# Patient Record
Sex: FEMALE | Race: WHITE | NOT HISPANIC OR LATINO | Employment: FULL TIME | URBAN - METROPOLITAN AREA
[De-identification: names, ages, dates, MRNs, and addresses within clinical notes are randomized per-mention and may not be internally consistent; named-entity substitution may affect disease eponyms.]

---
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
## 2020-01-29 ENCOUNTER — OFFICE VISIT (OUTPATIENT)
Dept: FAMILY MEDICINE | Facility: CLINIC | Age: 20
End: 2020-01-29
Payer: COMMERCIAL

## 2020-01-29 VITALS
RESPIRATION RATE: 18 BRPM | SYSTOLIC BLOOD PRESSURE: 121 MMHG | HEIGHT: 64 IN | TEMPERATURE: 97.6 F | BODY MASS INDEX: 45.27 KG/M2 | HEART RATE: 81 BPM | WEIGHT: 265.2 LBS | OXYGEN SATURATION: 99 % | DIASTOLIC BLOOD PRESSURE: 78 MMHG

## 2020-01-29 DIAGNOSIS — Z00.00 ROUTINE GENERAL MEDICAL EXAMINATION AT A HEALTH CARE FACILITY: Primary | ICD-10-CM

## 2020-01-29 DIAGNOSIS — R35.0 URINARY FREQUENCY: ICD-10-CM

## 2020-01-29 DIAGNOSIS — K21.9 GASTROESOPHAGEAL REFLUX DISEASE, ESOPHAGITIS PRESENCE NOT SPECIFIED: ICD-10-CM

## 2020-01-29 DIAGNOSIS — F32.1 CURRENT MODERATE EPISODE OF MAJOR DEPRESSIVE DISORDER WITHOUT PRIOR EPISODE (H): ICD-10-CM

## 2020-01-29 DIAGNOSIS — R06.09 DYSPNEA ON EXERTION: ICD-10-CM

## 2020-01-29 DIAGNOSIS — G43.109 MIGRAINE WITH AURA AND WITHOUT STATUS MIGRAINOSUS, NOT INTRACTABLE: ICD-10-CM

## 2020-01-29 PROBLEM — M25.552 HIP PAIN, LEFT: Status: RESOLVED | Noted: 2018-02-02 | Resolved: 2020-01-29

## 2020-01-29 PROBLEM — M25.551 HIP PAIN, RIGHT: Status: RESOLVED | Noted: 2018-02-02 | Resolved: 2020-01-29

## 2020-01-29 LAB
ALBUMIN SERPL-MCNC: 4 G/DL (ref 3.4–5)
ALBUMIN UR-MCNC: NEGATIVE MG/DL
ALP SERPL-CCNC: 70 U/L (ref 40–150)
ALT SERPL W P-5'-P-CCNC: 22 U/L (ref 0–50)
ANION GAP SERPL CALCULATED.3IONS-SCNC: 8 MMOL/L (ref 3–14)
APPEARANCE UR: CLEAR
AST SERPL W P-5'-P-CCNC: 12 U/L (ref 0–35)
BILIRUB SERPL-MCNC: 0.4 MG/DL (ref 0.2–1.3)
BILIRUB UR QL STRIP: ABNORMAL
BUN SERPL-MCNC: 11 MG/DL (ref 7–30)
CALCIUM SERPL-MCNC: 9.4 MG/DL (ref 8.5–10.1)
CHLORIDE SERPL-SCNC: 114 MMOL/L (ref 96–110)
CHOLEST SERPL-MCNC: 185 MG/DL
CO2 SERPL-SCNC: 17 MMOL/L (ref 20–32)
COLOR UR AUTO: YELLOW
CREAT SERPL-MCNC: 0.9 MG/DL (ref 0.5–1)
GFR SERPL CREATININE-BSD FRML MDRD: >90 ML/MIN/{1.73_M2}
GLUCOSE SERPL-MCNC: 105 MG/DL (ref 70–99)
GLUCOSE UR STRIP-MCNC: NEGATIVE MG/DL
HBA1C MFR BLD: 4.9 % (ref 0–5.6)
HDLC SERPL-MCNC: 44 MG/DL
HGB UR QL STRIP: NEGATIVE
KETONES UR STRIP-MCNC: NEGATIVE MG/DL
LDLC SERPL CALC-MCNC: 114 MG/DL
LEUKOCYTE ESTERASE UR QL STRIP: NEGATIVE
NITRATE UR QL: NEGATIVE
NONHDLC SERPL-MCNC: 141 MG/DL
PH UR STRIP: 5.5 PH (ref 5–7)
POTASSIUM SERPL-SCNC: 3.8 MMOL/L (ref 3.4–5.3)
PROT SERPL-MCNC: 8.2 G/DL (ref 6.8–8.8)
SODIUM SERPL-SCNC: 139 MMOL/L (ref 133–144)
SOURCE: ABNORMAL
SP GR UR STRIP: >1.03 (ref 1–1.03)
SPECIMEN SOURCE: NORMAL
TRIGL SERPL-MCNC: 137 MG/DL
UROBILINOGEN UR STRIP-ACNC: 0.2 EU/DL (ref 0.2–1)
WET PREP SPEC: NORMAL

## 2020-01-29 PROCEDURE — 36415 COLL VENOUS BLD VENIPUNCTURE: CPT | Performed by: NURSE PRACTITIONER

## 2020-01-29 PROCEDURE — 87210 SMEAR WET MOUNT SALINE/INK: CPT | Performed by: NURSE PRACTITIONER

## 2020-01-29 PROCEDURE — 99395 PREV VISIT EST AGE 18-39: CPT | Performed by: NURSE PRACTITIONER

## 2020-01-29 PROCEDURE — 81003 URINALYSIS AUTO W/O SCOPE: CPT | Performed by: NURSE PRACTITIONER

## 2020-01-29 PROCEDURE — 80053 COMPREHEN METABOLIC PANEL: CPT | Performed by: NURSE PRACTITIONER

## 2020-01-29 PROCEDURE — 87491 CHLMYD TRACH DNA AMP PROBE: CPT | Performed by: NURSE PRACTITIONER

## 2020-01-29 PROCEDURE — 80061 LIPID PANEL: CPT | Performed by: NURSE PRACTITIONER

## 2020-01-29 PROCEDURE — 83036 HEMOGLOBIN GLYCOSYLATED A1C: CPT | Performed by: NURSE PRACTITIONER

## 2020-01-29 RX ORDER — SERTRALINE HYDROCHLORIDE 100 MG/1
100 TABLET, FILM COATED ORAL DAILY
Qty: 90 TABLET | Refills: 1 | Status: SHIPPED | OUTPATIENT
Start: 2020-01-29 | End: 2020-07-15

## 2020-01-29 RX ORDER — ALBUTEROL SULFATE 90 UG/1
AEROSOL, METERED RESPIRATORY (INHALATION)
Qty: 1 INHALER | Refills: 3 | Status: SHIPPED | OUTPATIENT
Start: 2020-01-29 | End: 2024-03-26

## 2020-01-29 RX ORDER — TOPIRAMATE 50 MG/1
50 TABLET, FILM COATED ORAL 2 TIMES DAILY
Qty: 180 TABLET | Refills: 1 | Status: SHIPPED | OUTPATIENT
Start: 2020-01-29 | End: 2020-07-15

## 2020-01-29 ASSESSMENT — ANXIETY QUESTIONNAIRES
7. FEELING AFRAID AS IF SOMETHING AWFUL MIGHT HAPPEN: NOT AT ALL
GAD7 TOTAL SCORE: 6
GAD7 TOTAL SCORE: 6
1. FEELING NERVOUS, ANXIOUS, OR ON EDGE: SEVERAL DAYS
3. WORRYING TOO MUCH ABOUT DIFFERENT THINGS: SEVERAL DAYS
2. NOT BEING ABLE TO STOP OR CONTROL WORRYING: SEVERAL DAYS
4. TROUBLE RELAXING: SEVERAL DAYS
7. FEELING AFRAID AS IF SOMETHING AWFUL MIGHT HAPPEN: NOT AT ALL
5. BEING SO RESTLESS THAT IT IS HARD TO SIT STILL: SEVERAL DAYS
GAD7 TOTAL SCORE: 6
6. BECOMING EASILY ANNOYED OR IRRITABLE: SEVERAL DAYS

## 2020-01-29 ASSESSMENT — PATIENT HEALTH QUESTIONNAIRE - PHQ9
10. IF YOU CHECKED OFF ANY PROBLEMS, HOW DIFFICULT HAVE THESE PROBLEMS MADE IT FOR YOU TO DO YOUR WORK, TAKE CARE OF THINGS AT HOME, OR GET ALONG WITH OTHER PEOPLE: NOT DIFFICULT AT ALL
SUM OF ALL RESPONSES TO PHQ QUESTIONS 1-9: 6
SUM OF ALL RESPONSES TO PHQ QUESTIONS 1-9: 6

## 2020-01-29 ASSESSMENT — ENCOUNTER SYMPTOMS
FEVER: 0
COUGH: 1
NAUSEA: 1
PARESTHESIAS: 0
HEARTBURN: 0
SHORTNESS OF BREATH: 0
ABDOMINAL PAIN: 0
HEMATOCHEZIA: 0
ARTHRALGIAS: 0
CHILLS: 0
DIZZINESS: 0
MYALGIAS: 0
SORE THROAT: 0
EYE PAIN: 0
BREAST MASS: 0
HEADACHES: 1
NERVOUS/ANXIOUS: 0
FREQUENCY: 1
WEAKNESS: 0
DIARRHEA: 1
JOINT SWELLING: 0
PALPITATIONS: 0
CONSTIPATION: 0
HEMATURIA: 0
DYSURIA: 0

## 2020-01-29 ASSESSMENT — MIFFLIN-ST. JEOR: SCORE: 1959.77

## 2020-01-29 NOTE — PATIENT INSTRUCTIONS
Preventive Health Recommendations  Female Ages 18 to 20     Yearly exam:     See your health care provider every year in order to  o Review health changes.   o Discuss preventive care.    o Review your medicines if your doctor has prescribed any.      You should be tested each year for STDs (sexually transmitted diseases).       After age 20, talk to your provider about how often you should have cholesterol testing.      If you are at risk for diabetes, you should have a diabetes test (fasting glucose).     Shots:     Get a flu shot each year.     Get a tetanus shot every 10 years.     Consider getting the shot (vaccine) that prevents cervical cancer (Gardasil).    Nutrition:     Eat at least 5 servings of fruits and vegetables each day.    Eat whole-grain bread, whole-wheat pasta and brown rice instead of white grains and rice.    Get adequate Calcium and Vitamin D.     Lifestyle    Exercise at least 150 minutes a week each week (30 minutes a day, 5 days a week). This will help you control your weight and prevent disease.    No smoking.     Wear sunscreen to prevent skin cancer.    See your dentist every six months for an exam and cleaning.  Preventive Health Recommendations  Female Ages 18 to 20     Yearly exam:   See your health care provider every year in order to  Review health changes.   Discuss preventive care.    Review your medicines if your doctor has prescribed any.    You should be tested each year for STDs (sexually transmitted diseases).     After age 20, talk to your provider about how often you should have cholesterol testing.    If you are at risk for diabetes, you should have a diabetes test (fasting glucose).     Shots:   Get a flu shot each year.   Get a tetanus shot every 10 years.   Consider getting the shot (vaccine) that prevents cervical cancer (Gardasil).    Nutrition:   Eat at least 5 servings of fruits and vegetables each day.  Eat whole-grain bread, whole-wheat pasta and brown rice  instead of white grains and rice.  Get adequate Calcium and Vitamin D.     Lifestyle  Exercise at least 150 minutes a week each week (30 minutes a day, 5 days a week). This will help you control your weight and prevent disease.  No smoking.   Wear sunscreen to prevent skin cancer.  See your dentist every six months for an exam and cleaning.

## 2020-01-29 NOTE — PROGRESS NOTES
SUBJECTIVE:   CC: Vivian Carvalho is an 19 year old woman who presents for preventive health visit.     Healthy Habits:     Getting at least 3 servings of Calcium per day:  NO    Bi-annual eye exam:  Yes    Dental care twice a year:  NO    Sleep apnea or symptoms of sleep apnea:  None    Diet:  Regular (no restrictions)    Frequency of exercise:  1 day/week    Duration of exercise:  15-30 minutes    Taking medications regularly:  Yes    Medication side effects:  Other    PHQ-2 Total Score: 1    Additional concerns today:  No  1.  On depo, does not have period.Has never been sexually active.  2.  Weight:  Decrease 10 lbs from 10/2019.  3.  Depression: PHQ 9 score of 6, MORTEZA 7 score of 6. Improvement.  4.  Migraines:  Having 6 migraines per month, is able to go about normal activities.    5.   Ear problem:  Patient states that her ears have been bothering her for a couple months now. They been hurting and they are popping. Nasal congestion, infrequent cough.    Today's PHQ-2 Score:   PHQ-2 ( 1999 Pfizer) 1/29/2020   Q1: Little interest or pleasure in doing things 1   Q2: Feeling down, depressed or hopeless 0   PHQ-2 Score 1   Q1: Little interest or pleasure in doing things Several days   Q2: Feeling down, depressed or hopeless Not at all   PHQ-2 Score 1     Abuse: Current or Past(Physical, Sexual or Emotional)- No  Do you feel safe in your environment? Yes    Social History     Tobacco Use     Smoking status: Never Smoker     Smokeless tobacco: Never Used   Substance Use Topics     Alcohol use: No     Alcohol/week: 0.0 standard drinks     Alcohol Use 1/29/2020   Prescreen: >3 drinks/day or >7 drinks/week? Not Applicable   Prescreen: >3 drinks/day or >7 drinks/week? -     Reviewed orders with patient.  Reviewed health maintenance and updated orders accordingly - Yes  Lab work is in process  BP Readings from Last 3 Encounters:   01/29/20 121/78   10/11/19 119/85   09/20/19 115/77    Wt Readings from Last 3 Encounters:    01/29/20 120.3 kg (265 lb 3.2 oz) (>99 %)*   10/11/19 122.4 kg (269 lb 12.8 oz) (>99 %)*   09/20/19 122.9 kg (271 lb) (>99 %)*     * Growth percentiles are based on [facility] (Girls, 2-20 Years) data.         Patient Active Problem List   Diagnosis     Migraine with aura and without status migrainosus, not intractable     Morbid obesity due to excess calories (H)     Hip pain, left     Hip pain, right     Encounter for surveillance of injectable contraceptive     Morbid obesity (H)     Current moderate episode of major depressive disorder without prior episode (H)     Gastroesophageal reflux disease, esophagitis presence not specified     Vitamin D deficiency     History reviewed. No pertinent surgical history.    Social History     Tobacco Use     Smoking status: Never Smoker     Smokeless tobacco: Never Used   Substance Use Topics     Alcohol use: No     Alcohol/week: 0.0 standard drinks     Family History   Problem Relation Age of Onset     Hypertension Mother      Migraines Mother          Current Outpatient Medications   Medication Sig Dispense Refill     albuterol (PROAIR HFA/PROVENTIL HFA/VENTOLIN HFA) 108 (90 Base) MCG/ACT inhaler Inhale 2 puffs 15-20 minutes before exercise or as needed every 4-6 hours for SOB 1 Inhaler 3     Cholecalciferol (VITAMIN D PO)        medroxyPROGESTERone (DEPO-PROVERA) 150 MG/ML injection Inject 1 mL (150 mg) into the muscle every 3 months 3 mL 3     ranitidine (ZANTAC) 150 MG tablet Take 1 tablet (150 mg) by mouth 2 times daily 180 tablet 1     sertraline (ZOLOFT) 100 MG tablet Take 1 tablet (100 mg) by mouth daily 90 tablet 1     topiramate (TOPAMAX) 50 MG tablet Take 1 tablet (50 mg) by mouth 2 times daily 60 tablet 2       Mammogram not appropriate for this patient based on age.    Pertinent mammograms are reviewed under the imaging tab.  History of abnormal Pap smear: NO - under age 21, PAP not appropriate for age     Reviewed and updated as needed this visit by clinical  "staff  Tobacco  Allergies  Med Hx  Surg Hx  Fam Hx  Soc Hx        Reviewed and updated as needed this visit by Provider            Review of Systems   Constitutional: Negative for chills and fever.   HENT: Positive for ear pain. Negative for congestion, hearing loss and sore throat.    Eyes: Negative for pain and visual disturbance.   Respiratory: Positive for cough. Negative for shortness of breath.    Cardiovascular: Negative for chest pain, palpitations and peripheral edema.   Gastrointestinal: Positive for diarrhea and nausea. Negative for abdominal pain, constipation, heartburn and hematochezia.   Breasts:  Negative for tenderness, breast mass and discharge.   Genitourinary: Positive for frequency. Negative for dysuria, genital sores, hematuria, pelvic pain, urgency, vaginal bleeding and vaginal discharge.   Musculoskeletal: Negative for arthralgias, joint swelling and myalgias.   Skin: Negative for rash.   Neurological: Positive for headaches. Negative for dizziness, weakness and paresthesias.   Psychiatric/Behavioral: Negative for mood changes. The patient is not nervous/anxious.    Urinary frequency:  Has had urinary frequency for the last month. Denies dysuria,   GI: some diarrhea. After eating, improving.   OBJECTIVE:   /78 (BP Location: Right arm, Patient Position: Sitting, Cuff Size: Adult Large)   Pulse 81   Temp 97.6  F (36.4  C) (Oral)   Resp 18   Ht 1.621 m (5' 3.8\")   Wt 120.3 kg (265 lb 3.2 oz)   SpO2 99%   Breastfeeding No   BMI 45.81 kg/m    Physical Exam  GENERAL: healthy, alert and no distress  EYES: Eyes grossly normal to inspection, PERRL and conjunctivae and sclerae normal  HENT: ear canals and TM's normal, nose and mouth without ulcers or lesions  NECK: no adenopathy, no asymmetry, masses, or scars and thyroid normal to palpation  RESP: lungs clear to auscultation - no rales, rhonchi or wheezes  CV: regular rate and rhythm, normal S1 S2, no S3 or S4, no murmur, click or " "rub, no peripheral edema and peripheral pulses strong  ABDOMEN: soft, nontender, no hepatosplenomegaly, no masses and bowel sounds normal  MS: no gross musculoskeletal defects noted, no edema  SKIN: no suspicious lesions or rashes  NEURO: Normal strength and tone, mentation intact and speech normal  PSYCH: mentation appears normal, affect normal/bright  LYMPH: no cervical, supraclavicular, axillary, or inguinal adenopathy  ASSESSMENT/PLAN:   Vivian was seen today for physical.    Diagnoses and all orders for this visit:    Routine general medical examination at a health care facility  -     Comprehensive metabolic panel  -     Lipid panel reflex to direct LDL Fasting  -     Hemoglobin A1c  -     Chlamydia trachomatis PCR    Urinary frequency  -     Wet prep  -     UA reflex to Microscopic and Culture    Gastroesophageal reflux disease, esophagitis presence not specified: refill below. Suggested probiotic for diarrhea.  -     ranitidine (ZANTAC) 150 MG tablet; Take 1 tablet (150 mg) by mouth 2 times daily    Current moderate episode of major depressive disorder without prior episode (H):   PHQ 9 score of 6, MORTEZA 7 score of 6. Improvement.    -     sertraline (ZOLOFT) 100 MG tablet; Take 1 tablet (100 mg) by mouth daily    Migraine with aura and without status migrainosus, not intractable  -     topiramate (TOPAMAX) 50 MG tablet; Take 1 tablet (50 mg) by mouth 2 times daily    Dyspnea on exertion: takes on a very limited basis.  -     albuterol (PROAIR HFA/PROVENTIL HFA/VENTOLIN HFA) 108 (90 Base) MCG/ACT inhaler; Inhale 2 puffs 15-20 minutes before exercise or as needed every 4-6 hours for SOB    COUNSELING:  Reviewed preventive health counseling, as reflected in patient instructions       Regular exercise       Healthy diet/nutrition       Contraception    Estimated body mass index is 45.81 kg/m  as calculated from the following:    Height as of this encounter: 1.621 m (5' 3.8\").    Weight as of this encounter: " 120.3 kg (265 lb 3.2 oz).    Weight management plan: Discussed healthy diet and exercise guidelines     reports that she has never smoked. She has never used smokeless tobacco.      Counseling Resources:  ATP IV Guidelines  Pooled Cohorts Equation Calculator  Breast Cancer Risk Calculator  FRAX Risk Assessment  ICSI Preventive Guidelines  Dietary Guidelines for Americans, 2010  USDA's MyPlate  ASA Prophylaxis  Lung CA Screening  Follow up in 6 months, sooner as needed.  Susan Haase, APRN [facility]  Answers for HPI/ROS submitted by the patient on 1/29/2020   Annual Exam:  If you checked off any problems, how difficult have these problems made it for you to do your work, take care of things at home, or get along with other people?: Not difficult at all  PHQ9 TOTAL SCORE: 6  MORTEZA 7 TOTAL SCORE: 6

## 2020-01-30 LAB
C TRACH DNA SPEC QL NAA+PROBE: NEGATIVE
SPECIMEN SOURCE: NORMAL

## 2020-01-30 ASSESSMENT — ANXIETY QUESTIONNAIRES: GAD7 TOTAL SCORE: 6

## 2020-01-30 ASSESSMENT — PATIENT HEALTH QUESTIONNAIRE - PHQ9: SUM OF ALL RESPONSES TO PHQ QUESTIONS 1-9: 6

## 2020-01-30 NOTE — RESULT ENCOUNTER NOTE
[place],  Your lab results are as below:  1)  Urine and wet prep are normal, no signs of a urinary tract infection or yeast infection.  2)  Cholesterol is elevated at 185,  your LDL (bad cholesterol) and your HDL (good cholesterol) are slightly out of range. I would recommend following a low cholesterol diet (healthy eating) and increase exercise.  If you would like to meet with a dietician to discuss this please let me know.   3)  Glucose is elevated at 105 (normal  fasting is <100). Your A1C (test for diabetes) is normal at 4.9%.    If you have any questions do not hesitate to call the clinic to discuss the results with me further.     Sincerely,    Susan Haase, CNP

## 2020-03-12 ENCOUNTER — ALLIED HEALTH/NURSE VISIT (OUTPATIENT)
Dept: FAMILY MEDICINE | Facility: CLINIC | Age: 20
End: 2020-03-12
Payer: COMMERCIAL

## 2020-03-12 DIAGNOSIS — Z30.42 ENCOUNTER FOR DEPO-PROVERA CONTRACEPTION: Primary | ICD-10-CM

## 2020-03-12 PROCEDURE — 96372 THER/PROPH/DIAG INJ SC/IM: CPT

## 2020-03-12 PROCEDURE — 99207 ZZC NO CHARGE NURSE ONLY: CPT

## 2020-03-12 RX ADMIN — MEDROXYPROGESTERONE ACETATE 150 MG: 150 INJECTION, SUSPENSION INTRAMUSCULAR at 14:18

## 2020-03-12 NOTE — PROGRESS NOTES
Clinic Administered Medication Documentation      Depo Provera Documentation    URINE HCG: not indicated    Depo-Provera Standing Order inclusion/exclusion criteria reviewed.   Patient meets: inclusion criteria     BP: Data Unavailable  LAST PAP/EXAM: No results found for: PAP    Prior to injection, verified patient identity using patient's name and date of birth. Medication was administered. Please see MAR and medication order for additional information.     Was entire vial of medication used? Yes  Vial/Syringe: Single dose vial  Expiration Date:  10/2020    Patient instructed to report any adverse reaction to staff immediately .  NEXT INJECTION DUE: 5/28/20 - 6/11/20

## 2020-06-09 ENCOUNTER — TELEPHONE (OUTPATIENT)
Dept: FAMILY MEDICINE | Facility: CLINIC | Age: 20
End: 2020-06-09

## 2020-06-09 DIAGNOSIS — Z30.42 ENCOUNTER FOR SURVEILLANCE OF INJECTABLE CONTRACEPTIVE: Primary | ICD-10-CM

## 2020-06-09 RX ORDER — MEDROXYPROGESTERONE ACETATE 150 MG/ML
150 INJECTION, SUSPENSION INTRAMUSCULAR
Status: COMPLETED | OUTPATIENT
Start: 2020-06-11 | End: 2021-04-27

## 2020-06-09 NOTE — TELEPHONE ENCOUNTER
Hernan Love calls with Vivian in background of call. Requests schedule Depo-Provera injection. Last inj 3/12/20 so next due on or before June 11.   Depo is not on active med list, discontinued 1/29/20. Checking with PCP Pacos RN.   Ben Jay RN

## 2020-06-09 NOTE — TELEPHONE ENCOUNTER
Chart reviewed with Charu LILLY. We do not have open orders to schedule Depo-Provera and it is not on active med list    Verena, we need orders for Depo prior to scheduling.   Ben Jay RN

## 2020-06-09 NOTE — TELEPHONE ENCOUNTER
Order placed for injection.  Please call patient to help schedule if she hasn't already been scheduled.  Thanks,  Susan Haase, CNP

## 2020-06-10 ENCOUNTER — ALLIED HEALTH/NURSE VISIT (OUTPATIENT)
Dept: FAMILY MEDICINE | Facility: CLINIC | Age: 20
End: 2020-06-10
Payer: COMMERCIAL

## 2020-06-10 DIAGNOSIS — Z30.42 ENCOUNTER FOR DEPO-PROVERA CONTRACEPTION: Primary | ICD-10-CM

## 2020-06-10 PROCEDURE — 96372 THER/PROPH/DIAG INJ SC/IM: CPT

## 2020-06-10 PROCEDURE — 99207 ZZC NO CHARGE NURSE ONLY: CPT

## 2020-06-10 RX ADMIN — MEDROXYPROGESTERONE ACETATE 150 MG: 150 INJECTION, SUSPENSION INTRAMUSCULAR at 11:04

## 2020-06-10 NOTE — PROGRESS NOTES
BP: Data Unavailable    LAST PAP/EXAM: No results found for: PAP  URINE HCG:not indicated    The following medication was given:     MEDICATION: Depo Provera 150mg  ROUTE: IM  SITE: Ventrogluteal - Right  : Lise   LOT #: [id number]  EXP:OCT 2021  NEXT INJECTION DUE: 8/26/20 - 9/9/20     Cindy Dubon MA on 6/10/2020 at 11:05 AM

## 2020-07-17 ENCOUNTER — TELEPHONE (OUTPATIENT)
Dept: FAMILY MEDICINE | Facility: CLINIC | Age: 20
End: 2020-07-17

## 2020-08-28 ENCOUNTER — ALLIED HEALTH/NURSE VISIT (OUTPATIENT)
Dept: FAMILY MEDICINE | Facility: CLINIC | Age: 20
End: 2020-08-28
Payer: COMMERCIAL

## 2020-08-28 PROCEDURE — 99207 ZZC NO CHARGE NURSE ONLY: CPT

## 2020-08-28 PROCEDURE — 96372 THER/PROPH/DIAG INJ SC/IM: CPT

## 2020-08-28 RX ADMIN — MEDROXYPROGESTERONE ACETATE 150 MG: 150 INJECTION, SUSPENSION INTRAMUSCULAR at 08:28

## 2020-08-28 NOTE — PROGRESS NOTES
BP: Data Unavailable    LAST PAP/EXAM: No results found for: PAP  URINE HCG:not indicated    The following medication was given:     MEDICATION: Depo Provera 150mg  ROUTE: IM  SITE: Ventrogluteal - Right  : Mylan   LOT #: [id number]  EXP:OCT 2021  NEXT INJECTION DUE: 11/13/20 - 11/27/20   Provider: Susan Haase, APRN CNP Anna Perez, MA on 8/28/2020 at 8:29 AM

## 2020-11-02 DIAGNOSIS — F32.1 CURRENT MODERATE EPISODE OF MAJOR DEPRESSIVE DISORDER WITHOUT PRIOR EPISODE (H): ICD-10-CM

## 2020-11-02 NOTE — LETTER
[facility]  [address] [address] [id number] [phone]          November 11, 2020    Vivian Carvalho                                                                                                                     [address] [facility] [address]            Dear Vivian,  We have made several attempts to contact you.  You will need to call the clinic at [phone] to schedule an appointment prior to any further refills of your medication.       Sincerely,         Susan Rachele Haase CFNP

## 2020-11-03 RX ORDER — SERTRALINE HYDROCHLORIDE 100 MG/1
TABLET, FILM COATED ORAL
Qty: 30 TABLET | Refills: 0 | Status: SHIPPED | OUTPATIENT
Start: 2020-11-03 | End: 2020-11-18

## 2020-11-03 NOTE — TELEPHONE ENCOUNTER
Routing refill request to provider for review/approval because:  Patient needs to be seen because:  Due for 6 month mental health follow up  Elevated and outdated PHQ9    Verena Haynes RN on 11/3/2020 at 4:11 PM

## 2020-11-03 NOTE — TELEPHONE ENCOUNTER
Please call Vivian and ask her to set up a video visit with me to check her depression, a 30 day refill has been given.  Thanks,  Susan Haase, CNP

## 2020-11-16 ENCOUNTER — TELEPHONE (OUTPATIENT)
Dept: FAMILY MEDICINE | Facility: CLINIC | Age: 20
End: 2020-11-16

## 2020-11-16 DIAGNOSIS — G43.109 MIGRAINE WITH AURA AND WITHOUT STATUS MIGRAINOSUS, NOT INTRACTABLE: ICD-10-CM

## 2020-11-16 NOTE — TELEPHONE ENCOUNTER
General Call:     Who is calling:  Vivian    Reason for Call:  Schedule Depo    What are your questions or concerns:  Needs Depo Shot    Date of last appointment with provider: 01/29/2020-Susan Haase Okay to leave a detailed message:Yes at Cell number on file:    Telephone Information:   Mobile [phone]

## 2020-11-16 NOTE — TELEPHONE ENCOUNTER
Yearly physical due 1/29/21.  Will need physical when due for next depo.  Called patient.  Scheduled depo for tomorrow at 8:30 am.  Advised when next depo due will be due for yearly physical and to schedule physical in window of when depo due so can do both in one visit.  Patient agrees with plan.  Charu Hurtado RN    NEXT INJECTION DUE: 11/13/20 - 11/27/20

## 2020-11-17 ENCOUNTER — ALLIED HEALTH/NURSE VISIT (OUTPATIENT)
Dept: FAMILY MEDICINE | Facility: CLINIC | Age: 20
End: 2020-11-17
Payer: COMMERCIAL

## 2020-11-17 DIAGNOSIS — Z30.42 ENCOUNTER FOR DEPO-PROVERA CONTRACEPTION: Primary | ICD-10-CM

## 2020-11-17 PROCEDURE — 99207 PR NO CHARGE NURSE ONLY: CPT

## 2020-11-17 PROCEDURE — 96372 THER/PROPH/DIAG INJ SC/IM: CPT

## 2020-11-17 RX ORDER — TOPIRAMATE 50 MG/1
TABLET, FILM COATED ORAL
Qty: 180 TABLET | Refills: 0 | Status: SHIPPED | OUTPATIENT
Start: 2020-11-17 | End: 2020-11-18

## 2020-11-17 RX ADMIN — MEDROXYPROGESTERONE ACETATE 150 MG: 150 INJECTION, SUSPENSION INTRAMUSCULAR at 08:49

## 2020-11-17 NOTE — TELEPHONE ENCOUNTER
Prescription approved per [facility], [facility] or [facility] refill protocol.  Kirstin SANTOS - Registered Nurse  [facility]  Acute and Diagnostic Services

## 2020-11-17 NOTE — PROGRESS NOTES
Clinic Administered Medication Documentation      Depo Provera Documentation    URINE HCG: not indicated    Depo-Provera Standing Order inclusion/exclusion criteria reviewed.   Patient meets: inclusion criteria     BP: Data Unavailable  LAST PAP/EXAM: No results found for: PAP    Prior to injection, verified patient identity using patient's name and date of birth. Medication was administered. Please see MAR and medication order for additional information.     Was entire vial of medication used? Yes  Vial/Syringe: Single dose vial  Expiration Date:  03/2022    Patient instructed to remain in clinic for 15 minutes.  NEXT INJECTION DUE: 2/2/21 - 2/16/21  Mary Bai CMA

## 2020-11-18 ENCOUNTER — VIRTUAL VISIT (OUTPATIENT)
Dept: FAMILY MEDICINE | Facility: CLINIC | Age: 20
End: 2020-11-18
Payer: COMMERCIAL

## 2020-11-18 DIAGNOSIS — F32.1 CURRENT MODERATE EPISODE OF MAJOR DEPRESSIVE DISORDER WITHOUT PRIOR EPISODE (H): Primary | ICD-10-CM

## 2020-11-18 DIAGNOSIS — K21.9 GASTROESOPHAGEAL REFLUX DISEASE WITHOUT ESOPHAGITIS: ICD-10-CM

## 2020-11-18 DIAGNOSIS — G43.109 MIGRAINE WITH AURA AND WITHOUT STATUS MIGRAINOSUS, NOT INTRACTABLE: ICD-10-CM

## 2020-11-18 PROCEDURE — 99213 OFFICE O/P EST LOW 20 MIN: CPT | Mod: GT | Performed by: NURSE PRACTITIONER

## 2020-11-18 RX ORDER — TOPIRAMATE 50 MG/1
50 TABLET, FILM COATED ORAL 2 TIMES DAILY
Qty: 180 TABLET | Refills: 1 | Status: SHIPPED | OUTPATIENT
Start: 2020-11-18 | End: 2021-09-29

## 2020-11-18 RX ORDER — SERTRALINE HYDROCHLORIDE 100 MG/1
100 TABLET, FILM COATED ORAL DAILY
Qty: 90 TABLET | Refills: 1 | Status: SHIPPED | OUTPATIENT
Start: 2020-11-18 | End: 2021-06-08

## 2020-11-18 ASSESSMENT — PATIENT HEALTH QUESTIONNAIRE - PHQ9: SUM OF ALL RESPONSES TO PHQ QUESTIONS 1-9: 6

## 2020-11-18 NOTE — PROGRESS NOTES
"Vivian Carvalho is a 20 year old female who is being evaluated via a billable video visit.      The patient has been notified of following:     \"This video visit will be conducted via a call between you and your physician/provider. We have found that certain health care needs can be provided without the need for an in-person physical exam.  This service lets us provide the care you need with a video conversation.  If a prescription is necessary we can send it directly to your pharmacy.  If lab work is needed we can place an order for that and you can then stop by our lab to have the test done at a later time.    Video visits are billed at different rates depending on your insurance coverage.  Please reach out to your insurance provider with any questions.    If during the course of the call the physician/provider feels a video visit is not appropriate, you will not be charged for this service.\"    Patient has given verbal consent for Video visit? Yes  How would you like to obtain your AVS? [employer]  If you are dropped from the video visit, the video invite should be resent to: Other e-mail: PT GOING THRU [employer]  Will anyone else be joining your video visit? No  Subjective     Vivian Carvalho is a 20 year old female who presents today via video visit for the following health issues:    HPI     Depression Followup    How are you doing with your depression since your last visit? No change    Are you having other symptoms that might be associated with depression? No    Have you had a significant life event?  Health Concerns     Are you feeling anxious or having panic attacks?   No    Do you have any concerns with your use of alcohol or other drugs? No    Social History     Tobacco Use     Smoking status: Never Smoker     Smokeless tobacco: Never Used   Substance Use Topics     Alcohol use: No     Alcohol/week: 0.0 standard drinks     Drug use: No     PHQ 12/2/2019 1/29/2020 11/18/2020   PHQ-9 Total Score 10 6 6   Q9: " Thoughts of better off dead/self-harm past 2 weeks Not at all Not at all Not at all     MORTEZA-7 SCORE 8/16/2019 10/11/2019 1/29/2020   Total Score 12 (moderate anxiety) 7 (mild anxiety) 6 (mild anxiety)   Total Score 12 7 6     Last PHQ-9 11/18/2020   1.  Little interest or pleasure in doing things 1   2.  Feeling down, depressed, or hopeless 1   3.  Trouble falling or staying asleep, or sleeping too much 1   4.  Feeling tired or having little energy 1   5.  Poor appetite or overeating 1   6.  Feeling bad about yourself 1   7.  Trouble concentrating 0   8.  Moving slowly or restless 0   Q9: Thoughts of better off dead/self-harm past 2 weeks 0   PHQ-9 Total Score 6   Difficulty at work, home, or with people Somewhat difficult     PHQ 9 score of 6, denies thoughts of harming self or others. Feels that the sertraline is helping with the symptoms.   Suicide Assessment Five-step Evaluation and Treatment (SAFE-T)      How many servings of fruits and vegetables do you eat daily?  2-3    On average, how many sweetened beverages do you drink each day (Examples: soda, juice, sweet tea, etc.  Do NOT count diet or artificially sweetened beverages)?   0    How many days per week do you exercise enough to make your heart beat faster? 3 or less    How many minutes a day do you exercise enough to make your heart beat faster? 9 or less    How many days per week do you miss taking your medication? 0    Migraines:  Follow up on headaches(Migraines), has a couple every week, lasting 1/2 day to 1 full day. Does not take Tylenol or Ibuprofen for this, not too effective. Was started on topamax which has decreased the frequency of headaches.     Video Start Time: 1526       Review of Systems   CONSTITUTIONAL: NEGATIVE for fever, chills, change in weight  RESP: NEGATIVE for significant cough or SOB  CV: NEGATIVE for chest pain, palpitations or peripheral edema  NEURO: NEGATIVE for weakness, dizziness or paresthesias  PSYCHIATRIC: see HPI     "  Objective           Vitals:  No vitals were obtained today due to virtual visit.    Physical Exam     GENERAL: Healthy, alert and no distress  EYES: Eyes grossly normal to inspection.   RESP: No audible wheeze, cough, or visible cyanosis.  No visible retractions or increased work of breathing.    NEURO:  Mentation and speech appropriate for age.  PSYCH: Mentation appears normal, affect normal/bright, judgement and insight intact, normal speech and appearance well-groomed.              Assessment & Plan     Current moderate episode of major depressive disorder without prior episode (H)  PHQ 9 score of 6, denies thoughts of harming self or others. Feels that the sertraline is helping with the symptoms.Will  Continue on sertraline 100 mg daily.  - sertraline (ZOLOFT) 100 MG tablet  Dispense: 90 tablet; Refill: 1    Migraine with aura and without status migrainosus, not intractable; improved with topamax.  - topiramate (TOPAMAX) 50 MG tablet  Dispense: 180 tablet; Refill: 1    Gastroesophageal reflux disease without esophagitis:  Has not taken zantac for several months, discussed if has increase in symptoms to take OTC famotidine.         BMI:   Estimated body mass index is 45.81 kg/m  as calculated from the following:    Height as of 1/29/20: 1.621 m (5' 3.8\").    Weight as of 1/29/20: 120.3 kg (265 lb 3.2 oz).   Weight management plan: Discussed healthy diet and exercise guidelines             Return in about 6 months (around 5/18/2021) for Physical Exam.    Susan Haase, APRN CNP  [facility]      Video-Visit Details    Type of service:  Video Visit    Video End Time:1538    Originating Location (pt. Location): Home    Distant Location (provider location):  [facility]     Platform used for Video Visit: Daniela"

## 2020-12-20 ENCOUNTER — HEALTH MAINTENANCE LETTER (OUTPATIENT)
Age: 20
End: 2020-12-20

## 2021-02-28 ENCOUNTER — HEALTH MAINTENANCE LETTER (OUTPATIENT)
Age: 21
End: 2021-02-28

## 2021-03-01 ENCOUNTER — DOCUMENTATION ONLY (OUTPATIENT)
Dept: FAMILY MEDICINE | Facility: CLINIC | Age: 21
End: 2021-03-01

## 2021-03-01 NOTE — PROGRESS NOTES
Per Haase, Susan Rachele APRN CNP list patient should be SB #2 and is currently listed as SB #4     RN changed to SB #2 per pcp     Irlanda Ernandez, Registered Nurse, PAL (Patient Advocate Liason)   [facility]   [phone]

## 2021-03-02 ENCOUNTER — MYC MEDICAL ADVICE (OUTPATIENT)
Dept: FAMILY MEDICINE | Facility: CLINIC | Age: 21
End: 2021-03-02

## 2021-03-02 NOTE — TELEPHONE ENCOUNTER
Please call Vivian and set up a virtual visit I will need more specific information about what type of weight loss program she is interested in.  Thanks,  Susan Haase, CNP

## 2021-03-02 NOTE — TELEPHONE ENCOUNTER
Patient requesting referral to weight loss clinic, patient had referral placed 2019,  2020, please advise if this can be resent or if visit required, see below for request    From: Vivian Carvalho   Sent: 3/1/2021   3:24 PM CST   To: Cr Referral   Subject: Referral Request                                   Vivian Carvalho would like to request a referral.   Reason: Want to lose weight.   [facility]   Comment:   Last referral .     Javi Joseph RN

## 2021-03-30 ENCOUNTER — TELEPHONE (OUTPATIENT)
Dept: FAMILY MEDICINE | Facility: CLINIC | Age: 21
End: 2021-03-30

## 2021-03-30 DIAGNOSIS — Z30.42 ENCOUNTER FOR SURVEILLANCE OF INJECTABLE CONTRACEPTIVE: Primary | ICD-10-CM

## 2021-03-30 NOTE — TELEPHONE ENCOUNTER
?? Last depo 11/17/20.  Depo was due by 2/16/21.  Will need UPT.  Was advised 11/16/20 to schedule yearly physical.  Will need yearly physical and new order prior to injections after this one.  Charu Hurtado RN      Me         11/16/20 9:14 AM  Note     Yearly physical due 1/29/21.  Will need physical when due for next depo.  Called patient.  Scheduled depo for tomorrow at 8:30 am.  Advised when next depo due will be due for yearly physical and to schedule physical in window of when depo due so can do both in one visit.  Patient agrees with plan.  Charu Hurtado RN     NEXT INJECTION DUE: 11/13/20 - 11/27/20

## 2021-03-30 NOTE — TELEPHONE ENCOUNTER
General Call:     Who is calling:  Vivian    Reason for Call:  Depo Shot    What are your questions or concerns:  Need to schedule a Depo Shot    Date of last appointment with provider:     Okay to leave a detailed message:Yes at Home number on file [phone] (home)     You may also send a message through [employer]

## 2021-04-02 NOTE — TELEPHONE ENCOUNTER
Called patient and discussed below note.  States she forgot about her depo shot as was busy with her new puppy.  States using depo for her periods and not birth control.  Scheduled physical for 4/15/21 9 am.  Offered depo sooner.  She chose to wait til appointment to get depo.  Future UPT order entered.  Charu Hurtado RN

## 2021-04-10 ASSESSMENT — PATIENT HEALTH QUESTIONNAIRE - PHQ9: SUM OF ALL RESPONSES TO PHQ QUESTIONS 1-9: 17

## 2021-04-16 ASSESSMENT — PATIENT HEALTH QUESTIONNAIRE - PHQ9: SUM OF ALL RESPONSES TO PHQ QUESTIONS 1-9: 17

## 2021-04-26 ASSESSMENT — ENCOUNTER SYMPTOMS
COUGH: 0
NERVOUS/ANXIOUS: 1
FREQUENCY: 1
ARTHRALGIAS: 1
HEADACHES: 1
DIZZINESS: 0
CHILLS: 1
WEAKNESS: 0
BREAST MASS: 0
HEMATURIA: 0
PARESTHESIAS: 0
EYE PAIN: 1
CONSTIPATION: 0
FEVER: 0
DIARRHEA: 0
ABDOMINAL PAIN: 1
DYSURIA: 0
NAUSEA: 1
HEMATOCHEZIA: 0
MYALGIAS: 1
SORE THROAT: 0
HEARTBURN: 0
SHORTNESS OF BREATH: 0
PALPITATIONS: 0
JOINT SWELLING: 0

## 2021-04-26 ASSESSMENT — PATIENT HEALTH QUESTIONNAIRE - PHQ9
SUM OF ALL RESPONSES TO PHQ QUESTIONS 1-9: 17
SUM OF ALL RESPONSES TO PHQ QUESTIONS 1-9: 17
10. IF YOU CHECKED OFF ANY PROBLEMS, HOW DIFFICULT HAVE THESE PROBLEMS MADE IT FOR YOU TO DO YOUR WORK, TAKE CARE OF THINGS AT HOME, OR GET ALONG WITH OTHER PEOPLE: NOT DIFFICULT AT ALL

## 2021-04-27 ENCOUNTER — OFFICE VISIT (OUTPATIENT)
Dept: FAMILY MEDICINE | Facility: CLINIC | Age: 21
End: 2021-04-27
Payer: COMMERCIAL

## 2021-04-27 VITALS
HEART RATE: 84 BPM | WEIGHT: 275 LBS | SYSTOLIC BLOOD PRESSURE: 118 MMHG | TEMPERATURE: 97.9 F | RESPIRATION RATE: 16 BRPM | DIASTOLIC BLOOD PRESSURE: 76 MMHG | HEIGHT: 64 IN | BODY MASS INDEX: 46.95 KG/M2

## 2021-04-27 DIAGNOSIS — Z30.42 ENCOUNTER FOR SURVEILLANCE OF INJECTABLE CONTRACEPTIVE: ICD-10-CM

## 2021-04-27 DIAGNOSIS — G43.109 MIGRAINE WITH AURA AND WITHOUT STATUS MIGRAINOSUS, NOT INTRACTABLE: ICD-10-CM

## 2021-04-27 DIAGNOSIS — F32.1 CURRENT MODERATE EPISODE OF MAJOR DEPRESSIVE DISORDER WITHOUT PRIOR EPISODE (H): ICD-10-CM

## 2021-04-27 DIAGNOSIS — E55.9 VITAMIN D DEFICIENCY: Primary | ICD-10-CM

## 2021-04-27 DIAGNOSIS — Z00.00 ROUTINE GENERAL MEDICAL EXAMINATION AT A HEALTH CARE FACILITY: Primary | ICD-10-CM

## 2021-04-27 DIAGNOSIS — Z11.3 SCREENING FOR STDS (SEXUALLY TRANSMITTED DISEASES): ICD-10-CM

## 2021-04-27 LAB
ALBUMIN SERPL-MCNC: 3.5 G/DL (ref 3.4–5)
ALP SERPL-CCNC: 72 U/L (ref 40–150)
ALT SERPL W P-5'-P-CCNC: 30 U/L (ref 0–50)
ANION GAP SERPL CALCULATED.3IONS-SCNC: 4 MMOL/L (ref 3–14)
AST SERPL W P-5'-P-CCNC: 15 U/L (ref 0–45)
BASOPHILS # BLD AUTO: 0 10E9/L (ref 0–0.2)
BASOPHILS NFR BLD AUTO: 0.2 %
BILIRUB SERPL-MCNC: 0.7 MG/DL (ref 0.2–1.3)
BUN SERPL-MCNC: 10 MG/DL (ref 7–30)
CALCIUM SERPL-MCNC: 8.5 MG/DL (ref 8.5–10.1)
CHLORIDE SERPL-SCNC: 111 MMOL/L (ref 94–109)
CHOLEST SERPL-MCNC: 165 MG/DL
CO2 SERPL-SCNC: 24 MMOL/L (ref 20–32)
CREAT SERPL-MCNC: 0.82 MG/DL (ref 0.52–1.04)
DEPRECATED CALCIDIOL+CALCIFEROL SERPL-MC: 13 UG/L (ref 20–75)
DIFFERENTIAL METHOD BLD: NORMAL
EOSINOPHIL # BLD AUTO: 0.1 10E9/L (ref 0–0.7)
EOSINOPHIL NFR BLD AUTO: 1.2 %
ERYTHROCYTE [DISTWIDTH] IN BLOOD BY AUTOMATED COUNT: 13.4 % (ref 10–15)
GFR SERPL CREATININE-BSD FRML MDRD: >90 ML/MIN/{1.73_M2}
GLUCOSE SERPL-MCNC: 90 MG/DL (ref 70–99)
HCG UR QL: NEGATIVE
HCT VFR BLD AUTO: 40.5 % (ref 35–47)
HDLC SERPL-MCNC: 52 MG/DL
HGB BLD-MCNC: 13.7 G/DL (ref 11.7–15.7)
LDLC SERPL CALC-MCNC: 93 MG/DL
LYMPHOCYTES # BLD AUTO: 2.4 10E9/L (ref 0.8–5.3)
LYMPHOCYTES NFR BLD AUTO: 29.4 %
MCH RBC QN AUTO: 29.8 PG (ref 26.5–33)
MCHC RBC AUTO-ENTMCNC: 33.8 G/DL (ref 31.5–36.5)
MCV RBC AUTO: 88 FL (ref 78–100)
MONOCYTES # BLD AUTO: 0.7 10E9/L (ref 0–1.3)
MONOCYTES NFR BLD AUTO: 7.8 %
NEUTROPHILS # BLD AUTO: 5.1 10E9/L (ref 1.6–8.3)
NEUTROPHILS NFR BLD AUTO: 61.4 %
NONHDLC SERPL-MCNC: 113 MG/DL
PLATELET # BLD AUTO: 222 10E9/L (ref 150–450)
POTASSIUM SERPL-SCNC: 4 MMOL/L (ref 3.4–5.3)
PROT SERPL-MCNC: 7.4 G/DL (ref 6.8–8.8)
RBC # BLD AUTO: 4.6 10E12/L (ref 3.8–5.2)
SODIUM SERPL-SCNC: 139 MMOL/L (ref 133–144)
TRIGL SERPL-MCNC: 101 MG/DL
TSH SERPL DL<=0.005 MIU/L-ACNC: 3.13 MU/L (ref 0.4–4)
WBC # BLD AUTO: 8.3 10E9/L (ref 4–11)

## 2021-04-27 PROCEDURE — 81025 URINE PREGNANCY TEST: CPT | Performed by: NURSE PRACTITIONER

## 2021-04-27 PROCEDURE — 80050 GENERAL HEALTH PANEL: CPT | Performed by: NURSE PRACTITIONER

## 2021-04-27 PROCEDURE — 96372 THER/PROPH/DIAG INJ SC/IM: CPT | Performed by: NURSE PRACTITIONER

## 2021-04-27 PROCEDURE — 80061 LIPID PANEL: CPT | Performed by: NURSE PRACTITIONER

## 2021-04-27 PROCEDURE — 99395 PREV VISIT EST AGE 18-39: CPT | Mod: 25 | Performed by: NURSE PRACTITIONER

## 2021-04-27 PROCEDURE — 87591 N.GONORRHOEAE DNA AMP PROB: CPT | Performed by: NURSE PRACTITIONER

## 2021-04-27 PROCEDURE — 96127 BRIEF EMOTIONAL/BEHAV ASSMT: CPT | Performed by: NURSE PRACTITIONER

## 2021-04-27 PROCEDURE — 87491 CHLMYD TRACH DNA AMP PROBE: CPT | Performed by: NURSE PRACTITIONER

## 2021-04-27 PROCEDURE — 82306 VITAMIN D 25 HYDROXY: CPT | Performed by: NURSE PRACTITIONER

## 2021-04-27 PROCEDURE — 36415 COLL VENOUS BLD VENIPUNCTURE: CPT | Performed by: NURSE PRACTITIONER

## 2021-04-27 PROCEDURE — 99213 OFFICE O/P EST LOW 20 MIN: CPT | Mod: 25 | Performed by: NURSE PRACTITIONER

## 2021-04-27 RX ORDER — ERGOCALCIFEROL 1.25 MG/1
50000 CAPSULE, LIQUID FILLED ORAL
Qty: 12 CAPSULE | Refills: 0 | Status: SHIPPED | OUTPATIENT
Start: 2021-04-27 | End: 2021-07-14

## 2021-04-27 RX ORDER — SUMATRIPTAN 50 MG/1
50 TABLET, FILM COATED ORAL
Qty: 18 TABLET | Refills: 3 | Status: SHIPPED | OUTPATIENT
Start: 2021-04-27 | End: 2021-09-29

## 2021-04-27 RX ORDER — DESVENLAFAXINE 50 MG/1
50 TABLET, FILM COATED, EXTENDED RELEASE ORAL DAILY
Qty: 30 TABLET | Refills: 1 | Status: SHIPPED | OUTPATIENT
Start: 2021-04-27 | End: 2021-06-08

## 2021-04-27 RX ADMIN — MEDROXYPROGESTERONE ACETATE 150 MG: 150 INJECTION, SUSPENSION INTRAMUSCULAR at 08:07

## 2021-04-27 ASSESSMENT — MIFFLIN-ST. JEOR: SCORE: 2002.39

## 2021-04-27 ASSESSMENT — ENCOUNTER SYMPTOMS
SHORTNESS OF BREATH: 0
ARTHRALGIAS: 1
WEAKNESS: 0
JOINT SWELLING: 0
DIARRHEA: 0
COUGH: 0
ABDOMINAL PAIN: 1
DIZZINESS: 0
HEMATURIA: 0
CONSTIPATION: 0
BREAST MASS: 0
PARESTHESIAS: 0
DYSURIA: 0
CHILLS: 1
NAUSEA: 1
MYALGIAS: 1
HEADACHES: 1
HEARTBURN: 0
FEVER: 0
FREQUENCY: 1
SORE THROAT: 0
PALPITATIONS: 0
HEMATOCHEZIA: 0
NERVOUS/ANXIOUS: 1

## 2021-04-27 ASSESSMENT — ANXIETY QUESTIONNAIRES
IF YOU CHECKED OFF ANY PROBLEMS ON THIS QUESTIONNAIRE, HOW DIFFICULT HAVE THESE PROBLEMS MADE IT FOR YOU TO DO YOUR WORK, TAKE CARE OF THINGS AT HOME, OR GET ALONG WITH OTHER PEOPLE: NOT DIFFICULT AT ALL
6. BECOMING EASILY ANNOYED OR IRRITABLE: NEARLY EVERY DAY
GAD7 TOTAL SCORE: 15
3. WORRYING TOO MUCH ABOUT DIFFERENT THINGS: NEARLY EVERY DAY
5. BEING SO RESTLESS THAT IT IS HARD TO SIT STILL: NEARLY EVERY DAY
7. FEELING AFRAID AS IF SOMETHING AWFUL MIGHT HAPPEN: NOT AT ALL
1. FEELING NERVOUS, ANXIOUS, OR ON EDGE: SEVERAL DAYS
2. NOT BEING ABLE TO STOP OR CONTROL WORRYING: NEARLY EVERY DAY

## 2021-04-27 ASSESSMENT — PATIENT HEALTH QUESTIONNAIRE - PHQ9: 5. POOR APPETITE OR OVEREATING: MORE THAN HALF THE DAYS

## 2021-04-27 NOTE — PROGRESS NOTES
SUBJECTIVE:   CC: Vivian Carvalho is an 20 year old woman who presents for preventive health visit.     Future Appointments   Date Time Provider Department Center   4/27/2021  7:30 AM Haase, Susan Rachele, APRN CNP [facility]     Appointment Notes for this encounter:   Physical, depo shot- late, will need UPT, ordered and depo orders (rescheduled from 4/15 SH out)    Health Maintenance Due   Topic Date Due     ADVANCE CARE PLANNING  Never done     HEPATITIS C SCREENING  Never done     INFLUENZA VACCINE (1) 09/01/2020     ANNUAL REVIEW OF HM ORDERS  10/11/2020     PREVENTIVE CARE VISIT  01/29/2021     CHLAMYDIA SCREENING  01/29/2021     Health Maintenance addressed:  Chlamydia screening-will get today    MyChart Status:  Active and Using      Patient has been advised of split billing requirements and indicates understanding: Yes  Healthy Habits:     Getting at least 3 servings of Calcium per day:  NO    Bi-annual eye exam:  Yes    Dental care twice a year:  NO    Sleep apnea or symptoms of sleep apnea:  Daytime drowsiness and Excessive snoring    Diet:  Regular (no restrictions) and Breakfast skipped    Frequency of exercise:  1 day/week    Duration of exercise:  15-30 minutes    Taking medications regularly:  No    Barriers to taking medications:  Problems remembering to take them    Medication side effects:  Muscle aches, Lightheadedness and Other    PHQ-2 Total Score: 4    Menses:  On depo, had some vaginal bleeding several weeks ago.  Denies being sexually active.   Depression and Anxiety Follow-Up    How are you doing with your depression since your last visit? Worsened    How are you doing with your anxiety since your last visit?  No change    Are you having other symptoms that might be associated with depression or anxiety? No    Have you had a significant life event? No     Do you have any concerns with your use of alcohol or other drugs? No  PHQ 9 of 17, does not have the energy for self care, always feels  tired, going 1 week between taking a shower.  MORTEZA of 12.  Increase in depression over the past 2 months, unclear reason.  Taking sertraline 100 mg daily as prescribed. Denies thoughts of harming self or others. Has also had an increase in binge eating.       Social:  Working full time at [employer], has not been missing work.        Social History     Tobacco Use     Smoking status: Never Smoker     Smokeless tobacco: Never Used   Substance Use Topics     Alcohol use: Not Currently     Alcohol/week: 0.0 standard drinks     Drug use: Never     PHQ 4/10/2021 4/10/2021 4/26/2021   PHQ-9 Total Score 17 17 17   Q9: Thoughts of better off dead/self-harm past 2 weeks Not at all Not at all Not at all     MORTEZA-7 SCORE 8/16/2019 10/11/2019 1/29/2020   Total Score 12 (moderate anxiety) 7 (mild anxiety) 6 (mild anxiety)   Total Score 12 7 6       Today's PHQ-2 Score:   PHQ-2 ( 1999 [employer]) 4/26/2021   Q1: Little interest or pleasure in doing things 3   Q2: Feeling down, depressed or hopeless 1   PHQ-2 Score 4   Q1: Little interest or pleasure in doing things Nearly every day   Q2: Feeling down, depressed or hopeless Several days   PHQ-2 Score 4       Abuse: Current or Past (Physical, Sexual or Emotional) - No  Do you feel safe in your environment? Yes    Have you ever done Advance Care Planning? (For example, a Health Directive, POLST, or a discussion with a medical provider or your loved ones about your wishes): no    Social History     Tobacco Use     Smoking status: Never Smoker     Smokeless tobacco: Never Used   Substance Use Topics     Alcohol use: Not Currently     Alcohol/week: 0.0 standard drinks         Alcohol Use 4/26/2021   Prescreen: >3 drinks/day or >7 drinks/week? Not Applicable   Prescreen: >3 drinks/day or >7 drinks/week? -       Reviewed orders with patient.  Reviewed health maintenance and updated orders accordingly - Yes  Lab work is in process  BP Readings from Last 3 Encounters:   04/27/21 118/76   01/29/20  121/78   10/11/19 119/85    Wt Readings from Last 3 Encounters:   04/27/21 124.7 kg (275 lb)   01/29/20 120.3 kg (265 lb 3.2 oz) (>99 %, Z= 2.61)*   10/11/19 122.4 kg (269 lb 12.8 oz) (>99 %, Z= 2.62)*     * Growth percentiles are based on [facility] (Girls, 2-20 Years) data.                  Patient Active Problem List   Diagnosis     Migraine with aura and without status migrainosus, not intractable     Encounter for surveillance of injectable contraceptive     Morbid obesity (H)     Current moderate episode of major depressive disorder without prior episode (H)     Gastroesophageal reflux disease, esophagitis presence not specified     Vitamin D deficiency     No past surgical history on file.    Social History     Tobacco Use     Smoking status: Never Smoker     Smokeless tobacco: Never Used   Substance Use Topics     Alcohol use: Not Currently     Alcohol/week: 0.0 standard drinks     Family History   Problem Relation Age of Onset     Hypertension Mother      Migraines Mother      Depression Mother      Anxiety Disorder Mother      Asthma Mother      Obesity Mother      Hyperlipidemia Father      Depression Maternal Half-Sister      Anxiety Disorder Maternal Half-Sister      Asthma Maternal Half-Sister      Depression Maternal Half-Sister      Anxiety Disorder Maternal Half-Sister      Mental Illness Maternal Half-Sister         Bipolar disorder     Thyroid Disease Maternal Half-Sister      Obesity Maternal Half-Sister            Breast Cancer Screening:        History of abnormal Pap smear: NO - under age 21, PAP not appropriate for age     Reviewed and updated as needed this visit by clinical staff  Tobacco  Allergies               Reviewed and updated as needed this visit by Provider                    Review of Systems   Constitutional: Positive for chills. Negative for fever.   HENT: Positive for ear pain. Negative for congestion, hearing loss and sore throat.    Eyes: Negative for visual disturbance.  "  Respiratory: Negative for cough and shortness of breath.    Cardiovascular: Positive for chest pain. Negative for palpitations and peripheral edema.   Gastrointestinal: Positive for abdominal pain and nausea. Negative for constipation, diarrhea, heartburn and hematochezia.   Breasts:  Negative for tenderness, breast mass and discharge.   Genitourinary: Positive for frequency, vaginal bleeding and vaginal discharge. Negative for dysuria, genital sores, hematuria, pelvic pain and urgency.   Musculoskeletal: Positive for arthralgias and myalgias. Negative for joint swelling.   Skin: Negative for rash.   Neurological: Positive for headaches. Negative for dizziness, weakness and paresthesias.   Psychiatric/Behavioral: Positive for mood changes. The patient is nervous/anxious.      Ear pain:  Random stabs of pain, has had for years.   Headaches:  Taking topamax 100 mg at night, continues to have migraine headaches 1-2 per month.    Vaginal bleeding/discharge: due to missing depo.      OBJECTIVE:   /76 (BP Location: Right arm, Patient Position: Chair, Cuff Size: Adult Large)   Pulse 84   Temp 97.9  F (36.6  C) (Oral)   Resp 16   Ht 1.626 m (5' 4\")   Wt 124.7 kg (275 lb)   BMI 47.20 kg/m    Physical Exam  GENERAL: healthy, alert and no distress  EYES: Eyes grossly normal to inspection,  HENT: ear canals and TM's normal, nose and mouth without ulcers or lesions  NECK: no adenopathy, no asymmetry, masses, or scars and thyroid normal to palpation  RESP: lungs clear to auscultation - no rales, rhonchi or wheezes  BREAST: normal without masses, tenderness or nipple discharge and no palpable axillary masses or adenopathy  CV: regular rate and rhythm, normal S1 S2, no S3 or S4, no murmur, click or rub, no peripheral edema   ABDOMEN: soft, nontender, no hepatosplenomegaly, no masses and bowel sounds normal  MS: no gross musculoskeletal defects noted, no edema  SKIN: no suspicious lesions or rashes  NEURO: Normal " strength and tone, mentation intact and speech normal  PSYCH: mentation appears normal, affect flat, has not showered today, hair appears greasy.      ASSESSMENT/PLAN:   1. Routine general medical examination at a health care facility    - CBC with platelets differential  - Comprehensive metabolic panel  - Lipid panel reflex to direct LDL Fasting    2. Current moderate episode of major depressive disorder without prior episode (H)  PHQ 9 of 17, does not have the energy for self care, always feels tired, going 1 week between taking a shower.  MORTEZA 7of 12.   Due to increase in depression strongly suggested counseling, declined. Will start on pristiq 50 mg every day, will stop sertraline (instructed to decrease to 50 mg every day for 2 weeks and stop). Is aware of emergency resources if has any thoughts of harming self or others.   - Vitamin D Deficiency  - TSH with free T4 reflex  - desvenlafaxine (PRISTIQ) 50 MG 24 hr tablet; Take 1 tablet (50 mg) by mouth daily  Dispense: 30 tablet; Refill: 1    3. Encounter for surveillance of injectable contraceptive:HCG negative, gave depo today.  - HCG Qual, Urine ([id number])  - Medroxyprogesterone inj  1mg   (Depo Provera J-Code)    4. Screening for STDs (sexually transmitted diseases)  - NEISSERIA GONORRHOEA PCR  - CHLAMYDIA TRACHOMATIS PCR    5. Migraine with aura and without status migrainosus, not intractable: continue Topamax daily, will add Imitrex for prn use.  - SUMAtriptan (IMITREX) 50 MG tablet; Take 1 tablet (50 mg) by mouth at onset of headache for migraine May repeat in 2 hours. Max 4 tablets/24 hours.  Dispense: 18 tablet; Refill: 3    Patient has been advised of split billing requirements and indicates understanding: Yes  COUNSELING:  Reviewed preventive health counseling, as reflected in patient instructions       Regular exercise       Healthy diet/nutrition    Estimated body mass index is 47.2 kg/m  as calculated from the following:    Height as of this  "encounter: 1.626 m (5' 4\").    Weight as of this encounter: 124.7 kg (275 lb).    Weight management plan: Discussed healthy diet and exercise guidelines    She reports that she has never smoked. She has never used smokeless tobacco.      Counseling Resources:  ATP IV Guidelines  Pooled Cohorts Equation Calculator  Breast Cancer Risk Calculator  BRCA-Related Cancer Risk Assessment: FHS-7 Tool  FRAX Risk Assessment  ICSI Preventive Guidelines  Dietary Guidelines for Americans, 2010  USDA's MyPlate  ASA Prophylaxis  Lung CA Screening  Follow up in 6 weeks for depression, patient prefers in clinic.   Susan Haase, APRN CNP  [facility]  "

## 2021-04-28 LAB
C TRACH DNA SPEC QL NAA+PROBE: NEGATIVE
N GONORRHOEA DNA SPEC QL NAA+PROBE: NEGATIVE
SPECIMEN SOURCE: NORMAL
SPECIMEN SOURCE: NORMAL

## 2021-04-28 ASSESSMENT — ANXIETY QUESTIONNAIRES: GAD7 TOTAL SCORE: 15

## 2021-04-28 NOTE — RESULT ENCOUNTER NOTE
Pablo Park,  Your lab results are as below:  1)  TSH (thyroid level) 3.13 which is normal (range 0.4-4)  2)  Cholesterol is normal at 165,  your LDL (bad cholesterol) and your HDL (good cholesterol) are also within normal range.   3)  Glucose is normal at 90 (normal fasting is <100).  4)  Your vitamin d level is low at 13, normal is 20-75. Having a low vitamin D level can cause body aches and fatigue.  I would like you to take vitamin D 50,000 iu every week for the next 12 weeks.  I have sent in a prescriptions for vitamin D to your pharmacy.  After 12 weeks I would like you to come in for a lab only appointment to recheck your vitamin D level, you do not have to be fasting for that lab appointment.    If you have any questions do not hesitate to call the clinic to discuss the results with me further.     Sincerely,    Susan Haase, CNP

## 2021-06-08 ENCOUNTER — OFFICE VISIT (OUTPATIENT)
Dept: FAMILY MEDICINE | Facility: CLINIC | Age: 21
End: 2021-06-08
Payer: COMMERCIAL

## 2021-06-08 VITALS
OXYGEN SATURATION: 97 % | HEART RATE: 82 BPM | TEMPERATURE: 98.7 F | DIASTOLIC BLOOD PRESSURE: 70 MMHG | BODY MASS INDEX: 47.38 KG/M2 | WEIGHT: 276 LBS | SYSTOLIC BLOOD PRESSURE: 110 MMHG

## 2021-06-08 DIAGNOSIS — G43.109 MIGRAINE WITH AURA AND WITHOUT STATUS MIGRAINOSUS, NOT INTRACTABLE: ICD-10-CM

## 2021-06-08 DIAGNOSIS — F32.1 CURRENT MODERATE EPISODE OF MAJOR DEPRESSIVE DISORDER WITHOUT PRIOR EPISODE (H): Primary | ICD-10-CM

## 2021-06-08 PROCEDURE — 99214 OFFICE O/P EST MOD 30 MIN: CPT | Performed by: NURSE PRACTITIONER

## 2021-06-08 RX ORDER — DESVENLAFAXINE 50 MG/1
50 TABLET, FILM COATED, EXTENDED RELEASE ORAL DAILY
Qty: 90 TABLET | Refills: 1 | Status: SHIPPED | OUTPATIENT
Start: 2021-06-08 | End: 2021-10-11

## 2021-06-08 ASSESSMENT — ANXIETY QUESTIONNAIRES
GAD7 TOTAL SCORE: 8
7. FEELING AFRAID AS IF SOMETHING AWFUL MIGHT HAPPEN: SEVERAL DAYS
6. BECOMING EASILY ANNOYED OR IRRITABLE: MORE THAN HALF THE DAYS
3. WORRYING TOO MUCH ABOUT DIFFERENT THINGS: SEVERAL DAYS
2. NOT BEING ABLE TO STOP OR CONTROL WORRYING: SEVERAL DAYS
5. BEING SO RESTLESS THAT IT IS HARD TO SIT STILL: NOT AT ALL
4. TROUBLE RELAXING: SEVERAL DAYS
1. FEELING NERVOUS, ANXIOUS, OR ON EDGE: MORE THAN HALF THE DAYS
GAD7 TOTAL SCORE: 8
GAD7 TOTAL SCORE: 8
7. FEELING AFRAID AS IF SOMETHING AWFUL MIGHT HAPPEN: SEVERAL DAYS

## 2021-06-08 ASSESSMENT — PATIENT HEALTH QUESTIONNAIRE - PHQ9
SUM OF ALL RESPONSES TO PHQ QUESTIONS 1-9: 13
10. IF YOU CHECKED OFF ANY PROBLEMS, HOW DIFFICULT HAVE THESE PROBLEMS MADE IT FOR YOU TO DO YOUR WORK, TAKE CARE OF THINGS AT HOME, OR GET ALONG WITH OTHER PEOPLE: NOT DIFFICULT AT ALL
SUM OF ALL RESPONSES TO PHQ QUESTIONS 1-9: 13

## 2021-06-08 NOTE — PROGRESS NOTES
Assessment & Plan     Current moderate episode of major depressive disorder without prior episode (H):  PHQ 9 score of 13, denies thoughts of harming self or others. MORTEZA 7 of 8. Symptoms of depression improved will continue on pristiq.     - desvenlafaxine (PRISTIQ) 50 MG 24 hr tablet; Take 1 tablet (50 mg) by mouth daily    Migraine with aura and without status migrainosus, not intractable: increased migraines despite Topamax bid, will refer to neurology for further evaluation  - NEUROLOGY ADULT REFERRAL  6}     FUTURE APPOINTMENTS:       - Follow-up visit in 6 months for depression/migraines    Susan Haase, APRN [facility] MECHELLE Park is a 20 year old who presents for the following health issues     History of Present Illness       Mental Health Follow-up:  Patient presents to follow-up on Depression & Anxiety.Patient's depression since last visit has been:  Better  The patient is not having other symptoms associated with depression.  Patient's anxiety since last visit has been:  Better  The patient is not having other symptoms associated with anxiety.  Any significant life events: financial concerns and other  Patient is not feeling anxious or having panic attacks.  Patient has no concerns about alcohol or drug use.     Social History  Tobacco Use    Smoking status: Never Smoker    Smokeless tobacco: Never Used  Alcohol use: Not Currently    Alcohol/week: 0.0 standard drinks  Drug use: Never      Today's PHQ-9         PHQ-9 Total Score:     (P) 13   PHQ-9 Q9 Thoughts of better off dead/self-harm past 2 weeks :   (P) Not at all   Thoughts of suicide or self harm:      Self-harm Plan:        Self-harm Action:          Safety concerns for self or others:          Answers for HPI/ROS submitted by the patient on 6/8/2021   Chronic problems general questions HPI Form  If you checked off any problems, how difficult have these problems made it for you to do your work, take  care of things at home, or get along with other people?: Not difficult at all  PHQ9 TOTAL SCORE: 13  OMRTEZA 7 TOTAL SCORE: 8   At the last visit PHQ 9 was 17, MORTEZA 7 was 12.  Was started on Pristiq 50 mg and sertraline was discontinued. Today PHQ 9 score of 13, denies thoughts of harming self or others. MORTEZA 7 of 8.  Has had more energy to get out of bath, complete personal hygiene, is applying make up at least 3 days per week.      Migraines: have increased, missed work once.  Having migraines daily. Taking sumatriptan twice per week. Taking Topamax 50 mg bid, missing morning dose 2-3 times per week.             Review of Systems   CONSTITUTIONAL: NEGATIVE for fever, chills, change in weight  RESP: NEGATIVE for significant cough or SOB  CV: NEGATIVE for chest pain, palpitations or peripheral edema  NEURO: NEGATIVE for weakness, dizziness or paresthesias  PSYCHIATRIC: see HPI      Objective    /70 (BP Location: Right arm, Patient Position: Sitting, Cuff Size: Adult Regular)   Pulse 82   Temp 98.7  F (37.1  C) (Oral)   Wt 125.2 kg (276 lb)   SpO2 97%   BMI 47.38 kg/m    Body mass index is 47.38 kg/m .  Physical Exam   GENERAL: healthy, alert and no distress  RESP: lungs clear to auscultation - no rales, rhonchi or wheezes  CV: regular rate and rhythm, normal S1 S2, no S3 or S4, no murmur,   PSYCH: mentation appears normal, affect normal/bright

## 2021-06-09 ASSESSMENT — PATIENT HEALTH QUESTIONNAIRE - PHQ9: SUM OF ALL RESPONSES TO PHQ QUESTIONS 1-9: 13

## 2021-06-09 ASSESSMENT — ANXIETY QUESTIONNAIRES: GAD7 TOTAL SCORE: 8

## 2021-06-24 ENCOUNTER — PRE VISIT (OUTPATIENT)
Dept: NEUROLOGY | Facility: CLINIC | Age: 21
End: 2021-06-24

## 2021-06-24 NOTE — TELEPHONE ENCOUNTER
FUTURE VISIT INFORMATION      FUTURE VISIT INFORMATION:    Date: 6/30/2021    Time: 1010am    Location: [facility] [place]  REFERRAL INFORMATION:    Referring provider:  Susan Haase APRN CNP    Referring providers clinic:  [facility]    Reason for visit/diagnosis  Migraines     RECORDS REQUESTED FROM:       Clinic name Comments Records Status Imaging Status   Internal S. Haase-4/27/2021, 6/8/2021, 11/18/2020 Epic N/A

## 2021-06-29 ASSESSMENT — ENCOUNTER SYMPTOMS
BOWEL INCONTINENCE: 0
SPEECH CHANGE: 0
BLOOD IN STOOL: 0
PALPITATIONS: 0
HYPERTENSION: 0
HEADACHES: 1
SHORTNESS OF BREATH: 0
MYALGIAS: 1
EYE WATERING: 0
PARALYSIS: 0
HYPOTENSION: 0
MUSCLE WEAKNESS: 1
HEMATURIA: 0
TREMORS: 0
SLEEP DISTURBANCES DUE TO BREATHING: 0
HEMOPTYSIS: 0
STIFFNESS: 1
DYSPNEA ON EXERTION: 0
NERVOUS/ANXIOUS: 1
DOUBLE VISION: 0
EYE REDNESS: 0
MEMORY LOSS: 0
DECREASED LIBIDO: 0
JOINT SWELLING: 0
WEAKNESS: 1
NUMBNESS: 1
WHEEZING: 0
CONSTIPATION: 0
DECREASED CONCENTRATION: 1
DIFFICULTY URINATING: 0
SEIZURES: 0
ABDOMINAL PAIN: 0
MUSCLE CRAMPS: 1
EYE PAIN: 1
BACK PAIN: 1
RECTAL PAIN: 0
ARTHRALGIAS: 1
SYNCOPE: 0
SPUTUM PRODUCTION: 0
HEARTBURN: 1
COUGH: 0
INSOMNIA: 1
DISTURBANCES IN COORDINATION: 0
JAUNDICE: 0
SNORES LOUDLY: 1
FLANK PAIN: 0
ORTHOPNEA: 0
LIGHT-HEADEDNESS: 1
EYE IRRITATION: 1
HOT FLASHES: 0
DIZZINESS: 0
DYSURIA: 0
DEPRESSION: 1
LEG PAIN: 1
NECK PAIN: 1
COUGH DISTURBING SLEEP: 0
LOSS OF CONSCIOUSNESS: 0
DIARRHEA: 1
PANIC: 1
NAUSEA: 1
VOMITING: 0
TINGLING: 1
EXERCISE INTOLERANCE: 0
BLOATING: 1
POSTURAL DYSPNEA: 0

## 2021-06-29 ASSESSMENT — HEADACHE IMPACT TEST (HIT 6)
WHEN YOU HAVE A HEADACHE HOW OFTEN DO YOU WISH YOU COULD LIE DOWN: ALWAYS
HOW OFTEN DO HEADACHES LIMIT YOUR DAILY ACTIVITIES: RARELY
HOW OFTEN HAVE YOU FELT TOO TIRED TO WORK BECAUSE OF YOUR HEADACHES: VERY OFTEN
HOW OFTEN HAVE YOU FELT FED UP OR IRRITATED BECAUSE OF YOUR HEADACHES: ALWAYS
HOW OFTEN DID HEADACHS LIMIT CONCENTRATION ON WORK OR DAILY ACTIVITY: ALWAYS
HIT6 TOTAL SCORE: 69
WHEN YOU HAVE HEADACHES HOW OFTEN IS THE PAIN SEVERE: VERY OFTEN

## 2021-06-30 ENCOUNTER — TELEPHONE (OUTPATIENT)
Dept: NEUROLOGY | Facility: CLINIC | Age: 21
End: 2021-06-30

## 2021-06-30 ENCOUNTER — OFFICE VISIT (OUTPATIENT)
Dept: NEUROLOGY | Facility: CLINIC | Age: 21
End: 2021-06-30
Attending: NURSE PRACTITIONER
Payer: COMMERCIAL

## 2021-06-30 VITALS
SYSTOLIC BLOOD PRESSURE: 121 MMHG | DIASTOLIC BLOOD PRESSURE: 80 MMHG | OXYGEN SATURATION: 98 % | RESPIRATION RATE: 16 BRPM | HEART RATE: 94 BPM

## 2021-06-30 DIAGNOSIS — Z86.59 HISTORY OF DEPRESSION: ICD-10-CM

## 2021-06-30 DIAGNOSIS — G43.719 INTRACTABLE CHRONIC MIGRAINE WITHOUT AURA AND WITHOUT STATUS MIGRAINOSUS: Primary | ICD-10-CM

## 2021-06-30 PROCEDURE — 99203 OFFICE O/P NEW LOW 30 MIN: CPT | Performed by: NURSE PRACTITIONER

## 2021-06-30 RX ORDER — RIZATRIPTAN BENZOATE 5 MG/1
5-10 TABLET, ORALLY DISINTEGRATING ORAL
Qty: 18 TABLET | Refills: 6 | Status: SHIPPED | OUTPATIENT
Start: 2021-06-30 | End: 2022-07-11

## 2021-06-30 RX ORDER — FREMANEZUMAB-VFRM 225 MG/1.5ML
225 INJECTION SUBCUTANEOUS
Qty: 1.5 ML | Refills: 11 | Status: SHIPPED | OUTPATIENT
Start: 2021-06-30 | End: 2022-09-02

## 2021-06-30 RX ORDER — NAPROXEN 500 MG/1
500 TABLET ORAL EVERY 12 HOURS PRN
Qty: 30 TABLET | Refills: 3 | Status: SHIPPED | OUTPATIENT
Start: 2021-06-30 | End: 2022-12-19

## 2021-06-30 ASSESSMENT — PAIN SCALES - GENERAL: PAINLEVEL: MODERATE PAIN (4)

## 2021-06-30 NOTE — PATIENT INSTRUCTIONS
Plan:  A trial of Ajovy for chronic migraine prevention.Side effects-allergic reaction or pain in the injection side or redness in the injection side. Unknown side effects with a long term use. Pregnancy is contraindicated.   Acute migraine headache treatment -stop sumatriptan   A trial of rizatriptan as needed. Do not take it with sumatriptan. Limit use to no more than 9 days per month  May try naproxen 500 mg every 12 hours as needed with food. Limit use to no more than 14 days per month  Stay hydrated  Follow up in 2-3 months or sooner if needed       Patient Education     Ajovy Prefilled Syringe 150 mg/mL  Uses  For migraine prevention.  Instructions  This medicine is used by injecting it into the skin. Please ask your doctor, nurse or pharmacist for the correct places on your body where this medicine can be injected.  Carefully follow the instructions for preparing this medicine before injection.  Read and make sure you understand the instructions for measuring your dose and using the syringe before using this medicine.  Always inspect the medicine before using.  The liquid should be clear or light yellow.  Do not use the medicine if it contains any particles or if it has changed color.  Do not shake the medicine before using.  Keep this medicine in the refrigerator. Do not freeze.  Keep the medicine in its original container.  Protect medicine from light.  Take the medicine out of the refrigerator about 30 minutes before use to warm to room temperature.  Discard unused medicine after 24 hours at room temperature.  Never use any medicine that has .  Discard any remaining medicine after your dose is given.  Please ask your doctor, nurse, or pharmacist how to discard unused medicines safely.  Wash your hands before and after handling this medicine.  Ask your doctor, nurse or pharmacist to show you how to use this medicine correctly.  You or a family member can be trained to give this medicine at home.  Do  not inject into or near a vein, artery or nerve.  Avoid injecting the medicine within 2 inches around the navel.  Do not inject into skin that is red, swollen or itchy.  Change the location of the injection each time. Choose a location at least 1 inch from the last injection.  Do not rub or massage the area where the injection was given.  If you forget to take a dose on time, take it as soon as you remember. If it is almost time for the next dose, do not take the missed dose. Return to your normal dosing schedule. Do not take 2 doses of this medicine at one time.  Please tell your doctor and pharmacist about all the medicines you take. Include both prescription and over-the-counter medicines. Also tell them about any vitamins, herbal medicines, or anything else you take for your health.  If your symptoms do not improve or they worsen while on this medicine, contact your doctor.  Cautions  Tell your doctor and pharmacist if you ever had an allergic reaction to a medicine. Symptoms of an allergic reaction can include trouble breathing, skin rash, itching, swelling, or severe dizziness.  Do not use the medication any more than instructed.  Tell the doctor or pharmacist if you are pregnant, planning to be pregnant, or breastfeeding.  Ask your pharmacist if this medicine can interact with any of your other medicines. Be sure to tell them about all the medicines you take.  Please tell all your doctors and dentists that you are on this medicine before they provide care.  Do not start or stop any other medicines without first speaking to your doctor or pharmacist.  Used needles and syringes should be thrown away properly in a medical waste container. Ask your doctor or pharmacist if you need help.  Do not share this medicine with anyone who has not been prescribed this medicine.  Side Effects  The following is a list of some common side effects from this medicine. Please speak with your doctor about what you should do if  you experience these or other side effects.    reaction at the area of the injection (pain, redness, swelling)  A few people may have an allergic reactions to this medicine. Symptoms can include difficulty breathing, skin rash, itching, swelling, or severe dizziness. If you notice any of these symptoms, seek medical help quickly.  Extra  Please speak with your doctor, nurse, or pharmacist if you have any questions about this medicine.  [email or web address]  IMPORTANT NOTE: This document tells you briefly how to take your medicine, but it does not tell you all there is to know about it.Your doctor or pharmacist may give you other documents about your medicine. Please talk to them if you have any questions.Always follow their advice. There is a more complete description of this medicine available in English.Scan this code on your smartphone or tablet or use the web address below. You can also ask your pharmacist for a printout. If you have any questions, please ask your pharmacist.     2021 [email or web address].           Patient Education     Patient Education    Rizatriptan Benzoate Oral disintegrating tablet    Rizatriptan Benzoate Oral tablet  Rizatriptan Benzoate Oral disintegrating tablet  What is this medicine?  RIZATRIPTAN (rye za TRIP tan) is used to treat migraines with or without aura. An aura is a strange feeling or visual disturbance that warns you of an attack. It is not used to prevent migraines.  This medicine may be used for other purposes; ask your health care provider or pharmacist if you have questions.  What should I tell my health care provider before I take this medicine?  They need to know if you have any of these conditions:    bowel disease or colitis    diabetes    family history of heart disease    fast or irregular heart beat    heart or blood vessel disease, angina (chest pain), or previous heart attack    high blood pressure    high cholesterol    history of  stroke, transient ischemic attacks (TIAs or mini-strokes), or intracranial bleeding    kidney or liver disease    overweight    poor circulation    postmenopausal or surgical removal of uterus and ovaries    an unusual or allergic reaction to rizatriptan, other medicines, foods, dyes, or preservatives    pregnant or trying to get pregnant    breast-feeding  How should I use this medicine?  Take this medicine by mouth. Follow the directions on the prescription label. This medicine is taken at the first symptoms of a migraine. It is not for everyday use. Leave the tablet in the foil package until you are ready to take it. Do not push the tablet through the blister pack. Peel open the blister pack with dry hands and place the tablet on your tongue. The tablet will dissolve rapidly and be swallowed in your saliva. It is not necessary to drink any water to take this medicine. If your migraine headache returns after one dose, you can take another dose as directed. You must leave at least 2 hours between doses, and do not take more than 30 mg total in 24 hours. If there is no improvement at all after the first dose, do not take a second dose without talking to your doctor or health care professional. Do not take your medicine more often than directed.  Talk to your pediatrician regarding the use of this medicine in children. While this drug may be prescribed for children as young as 6 years for selected conditions, precautions do apply.  Overdosage: If you think you have taken too much of this medicine contact a poison control center or emergency room at once.  NOTE: This medicine is only for you. Do not share this medicine with others.  What if I miss a dose?  This does not apply; this medicine is not for regular use.  What may interact with this medicine?  Do not take this medicine with any of the following medicines:    amphetamine, dextroamphetamine or cocaine    dihydroergotamine, ergotamine, ergoloid mesylates,  methysergide, or ergot-type medication - do not take within 24 hours of taking rizatriptan    feverfew    MAOIs like Carbex, Eldepryl, Marplan, Nardil, and Parnate - do not take rizatriptan within 2 weeks of stopping MAOI therapy.    other migraine medicines like almotriptan, eletriptan, naratriptan, sumatriptan, zolmitriptan - do not take within 24 hours of taking rizatriptan    tryptophan  This medicine may also interact with the following medications:    medicines for mental depression, anxiety or mood problems    propranolol  This list may not describe all possible interactions. Give your health care provider a list of all the medicines, herbs, non-prescription drugs, or dietary supplements you use. Also tell them if you smoke, drink alcohol, or use illegal drugs. Some items may interact with your medicine.  What should I watch for while using this medicine?  Only take this medicine for a migraine headache. Take it if you get warning symptoms or at the start of a migraine attack. It is not for regular use to prevent migraine attacks.  You may get drowsy or dizzy. Do not drive, use machinery, or do anything that needs mental alertness until you know how this medicine affects you. To reduce dizzy or fainting spells, do not sit or stand up quickly, especially if you are an older patient. Alcohol can increase drowsiness, dizziness and flushing. Avoid alcoholic drinks.  Smoking cigarettes may increase the risk of heart-related side effects from using this medicine.  If you take migraine medicines for 10 or more days a month, your migraines may get worse. Keep a diary of headache days and medicine use. Contact your healthcare professional if your migraine attacks occur more frequently.  What side effects may I notice from receiving this medicine?  Side effects that you should report to your doctor or health care professional as soon as possible:    allergic reactions like skin rash, itching or hives, swelling of the  face, lips, or tongue    fast, slow, or irregular heart beat    increased or decreased blood pressure    seizures    severe stomach pain and cramping, bloody diarrhea    signs and symptoms of a blood clot such as breathing problems; changes in vision; chest pain; severe, sudden headache; pain, swelling, warmth in the leg; trouble speaking; sudden numbness or weakness of the face, arm or leg    tingling, pain, or numbness in the face, hands, or feet  Side effects that usually do not require medical attention (report to your doctor or health care professional if they continue or are bothersome):    drowsiness    dry mouth    feeling warm, flushing, or redness of the face    headache    muscle cramps, pain    nausea, vomiting    unusually weak or tired  This list may not describe all possible side effects. Call your doctor for medical advice about side effects. You may report side effects to FDA at 4-330-FDA-5024.  Where should I keep my medicine?  Keep out of the reach of children.  Store at room temperature between 15 and 30 degrees C (59 and 86 degrees F). Protect from light and moisture. Throw away any unused medicine after the expiration date.  NOTE:This sheet is a summary. It may not cover all possible information. If you have questions about this medicine, talk to your doctor, pharmacist, or health care provider. Copyright  2016 [employer]           Patient Education     Naproxen Oral Tablet 500 mg  Uses  This medicine is used for the following purposes:    fever    inflammatory disease    pain  Instructions  Take the medicine with 250 mL (1 cup) of water.  Sit or stand upright for 30 minutes after taking the medicine. Do not lie down.  You may take with food to prevent stomach upset.  Store at room temperature in a dry place. Do not keep in the bathroom.  Keep the medicine away from heat and light.  This medicine may cause you to become more sensitive to the sun. Use sunscreen or wear protective clothing  when you are exposed to the sun.  If you forget to take a dose on time, take it as soon as you remember. If it is almost time for the next dose, do not take the missed dose. Return to your normal dosing schedule. Do not take 2 doses of this medicine at one time.  Please tell your doctor and pharmacist about all the medicines you take. Include both prescription and over-the-counter medicines. Also tell them about any vitamins, herbal medicines, or anything else you take for your health.  Cautions  Tell your doctor and pharmacist if you ever had an allergic reaction to a medicine. Symptoms of an allergic reaction can include trouble breathing, skin rash, itching, swelling, or severe dizziness.  Some patients with weak hearts may have worsening of symptoms. If you notice difficulty breathing, weight gain, or swelling of your legs or ankles, let your doctor know right away.  This medicine is associated with an increased risk of serious heart problems, heart attack, and stroke. Please speak with your doctor about the risks and benefits of using this medicine. Contact your doctor immediately if you experience chest pain or difficulty breathing.  This medicine may cause serious bleeding from the stomach or bowels. Stop this medicine and call your doctor immediately if you see any signs of bleeding. Bleeding can cause pain in the stomach, vomiting up liquid that looks like coffee grounds, and red or dark tarry stools.  Do not use the medication any more than instructed.  Your ability to stay alert or to react quickly may be impaired by this medicine. Do not drive or operate machinery until you know how this medicine will affect you.  Speak with your doctor before taking any medicine with aspirin.  Please check with your doctor before drinking alcohol while on this medicine.  Contact your doctor if you notice a change in the amount or darkening of your urine.  Tell the doctor or pharmacist if you are pregnant, planning to be  pregnant, or breastfeeding.  This medicine can hurt a new baby in the womb. If you become pregnant while on this medicine, tell your doctor immediately. Your doctor may switch you to a different medicine.  Ask your pharmacist if this medicine can interact with any of your other medicines. Be sure to tell them about all the medicines you take.  Please tell all your doctors and dentists that you are on this medicine before they provide care.  Do not start or stop any other medicines without first speaking to your doctor or pharmacist.  Call your doctor right away if you notice any unusual bleeding or bruising.  Do not share this medicine with anyone who has not been prescribed this medicine.  This medicine can cause serious side effects in some patients. Important information from the [place] Food and Drug Administration (FDA) is available from your pharmacist. Please review it carefully with your pharmacist to understand the risks associated with this medicine.  Side Effects  The following is a list of some common side effects from this medicine. Please speak with your doctor about what you should do if you experience these or other side effects.    increased risk of bleeding    bloating    constipation    diarrhea    dizziness    drowsiness or sedation    excess gas    high blood pressure    nausea    skin irritation such as redness, itching, rash, or burning    ringing in the ears    stomach upset or abdominal pain    increased risk of sunburn    vomiting  Call your doctor or get medical help right away if you notice any of these more serious side effects:    unusual bruising or discoloration on skin    chest pain    coughing up blood or vomit that looks like coffee grounds    swelling of the legs, feet, and hands    symptoms of liver damage (such as yellowing of skin or eyes, dark urine, unusual tiredness or weakness; severe stomach or back pain)    seizures    shortness of breath    slurred speech    dark, tarry  stool    urinating less often    severe or persistent vomiting    weakness on one side of the body    sudden or unexplained weight gain  A few people may have an allergic reactions to this medicine. Symptoms can include difficulty breathing, skin rash, itching, swelling, or severe dizziness. If you notice any of these symptoms, seek medical help quickly.  Extra  Please speak with your doctor, nurse, or pharmacist if you have any questions about this medicine.  [email or web address]  IMPORTANT NOTE: This document tells you briefly how to take your medicine, but it does not tell you all there is to know about it.Your doctor or pharmacist may give you other documents about your medicine. Please talk to them if you have any questions.Always follow their advice. There is a more complete description of this medicine available in English.Scan this code on your smartphone or tablet or use the web address below. You can also ask your pharmacist for a printout. If you have any questions, please ask your pharmacist.     2021 [employer].

## 2021-06-30 NOTE — PROGRESS NOTES
ASSESSMENT AND PLAN:  Chronic Headache appear to be migrainous in phenotype and possible genetic in etiology. Non focal neurological exam and no papilledema. It would be not unreasonable to wait with any imaging unless patient doesn't respond treatment or worsening headaches or any new symptoms.     Plan:  A trial of Ajovy for chronic migraine prevention.Side effects-allergic reaction or pain in the injection side or redness in the injection side. Unknown side effects with a long term use. Pregnancy is contraindicated.   Acute migraine headache treatment -stop sumatriptan   A trial of rizatriptan as needed. Do not take it with sumatriptan. Limit use to no more than 9 days per month  May try naproxen 500 mg every 12 hours as needed with food. Limit use to no more than 14 days per month  Stay hydrated  Follow up in 2-3 months or sooner if needed       Subjective   Vivian is a 20 year old who presents for the following health issues -HEADACHE   HPI Headaches onset since 6th grade. Family history-mother, both sisters -chronic migraine headaches.   Headache is in the forehead and pounding and throbbing.   Headache frequency -daily 30 days out of 30 days per month and severe headaches. The same headache pattern for at least 1-2 years. Headaches frontal at baseline less than 5/10 and severe at 5/10 on the numeric pain scale. Associated photo and phonophobia, nausea and vomiting. No visual symptoms. Headaches sometimes keep patient up at night. No weakness or numbness in UE or LE but some tingling at times due to topiramate.   Headache worse -loud noises, lights. Works in the retail so fluorescent lights or noises  Headaches are better sleeping in the quit area.   Headache worse with activity or moving to fast.   Headache treatment -topiramate 100 mg BID for about a year   Amitriptyline 150 mg caused weight gain  Tried propranolol -caused side effects and did not help  Sertraline   Sumatriptan as needed but takes it  almost every day  Tried mom's nurtec -helps a little bit    On Prestiq for depression and has been helpful. Depression is stable. Has been managed by PCP.     Vit D def-on on supplement    DEPO shot for birth control    Sleep Clinic eval-no sleep apnea  Ophthalmology evaluation last June 2020-presumed normal    Has been staying hydrated   Caffeine -occasional and when can afford to drink coffee  Lives with parents and brother and two dogs.     PMH:  migraine  Past Medical History:   Diagnosis Date     Depressive disorder 2019     Uncomplicated asthma 2017     Allergies and medications reviewed    Review of Systems   Constitutional, HEENT, cardiovascular, pulmonary, gi and gu systems are negative, except as otherwise noted.      Objective    There were no vitals taken for this visit.  There is no height or weight on file to calculate BMI.  Physical Exam   GENERAL: healthy, alert and no distress, headache today 5/10  EYES: Eyes grossly normal to inspection, PERRL and conjunctivae and sclerae normal  HENT: ear canals and TM's normal, nose and mouth without ulcers or lesions  NECK: no adenopathy, no asymmetry, masses, or scars and thyroid normal to palpation  CV: regular rate and rhythm, normal S1 S2, no S3 or S4, no murmur, click or rub, no peripheral edema and peripheral pulses strong  MS: no gross musculoskeletal defects noted, no edema  SKIN: no suspicious lesions or rashes  NEURO: Normal strength and tone, sensory exam grossly normal, mentation intact, speech normal, cranial nerves 2-12 intact, DTR's normal and symmetric and Romberg normal  PSYCH: mentation appears normal, affect normal/bright    Diagnostic Test Results   TSH 3.13  CMP and CBC -normal  Normal glucose     I discussed all my recommendations with Vivian Carvalho who verbalizes understanding and comfortable with the plan.  All of patient's questions were answered from the best of my knowledge.  Patient is in agreement with the plan.     36 minutes  spent on the date of the encounter doing chart review, history and exam, documentation and further activities as noted above    RAYNA Leary, CNP [facility]  Headache certified  [facility]      Answers for HPI/ROS submitted by the patient on 6/29/2021   General Symptoms: No  Skin Symptoms: No  HENT Symptoms: No  EYE SYMPTOMS: Yes  HEART SYMPTOMS: Yes  LUNG SYMPTOMS: Yes  INTESTINAL SYMPTOMS: Yes  URINARY SYMPTOMS: Yes  GYNECOLOGIC SYMPTOMS: Yes  BREAST SYMPTOMS: No  SKELETAL SYMPTOMS: Yes  BLOOD SYMPTOMS: No  NERVOUS SYSTEM SYMPTOMS: Yes  MENTAL HEALTH SYMPTOMS: Yes  Vision loss: No  Dry eyes: No  Watery eyes: No  Eye bulging: No  Double vision: No  Flashing of lights: No  Spots: No  Floaters: No  Crossed eyes: No  Tunnel Vision: No  Yellowing of eyes: No  Eye irritation: Yes  Sputum or phlegm: No  Coughing up blood: No  Snoring: Yes  Difficulty breathing on exertion: No  Nighttime Cough: No  Difficulty breathing when lying flat: No  Pain in legs with walking: Yes  Trouble breathing while lying down: No  Fingers or toes appear blue: No  High blood pressure: No  Low blood pressure: No  Fainting: No  Murmurs: No  Pacemaker: No  Varicose veins: No  Edema or swelling: No  Wake up at night with shortness of breath: No  Exercise intolerance: No  Bloating: Yes  Blood in stool: No  Black stools: No  Fecal incontinence: No  Yellowing of skin or eyes: No  Vomit with blood: No  Change in stools: Yes  Trouble holding urine or incontinence: Yes  Increased frequency of urination: Yes  Decreased frequency of urination: No  Frequent nighttime urination: Yes  Bone pain: No  Muscle cramps: Yes  Muscle weakness: Yes  Joint stiffness: Yes  Bone fracture: No  Trouble with coordination: No  Fainting or black-out spells: No  Memory loss: No  Speech problems: No  Tingling: Yes  Difficulty walking: No  Paralysis: No  Bleeding or spotting between periods: Yes  Heavy or painful periods: No  Irregular periods: No  Hot flashes:  No  Vaginal dryness: No  Reduced libido: No  Difficulty with sexual arousal: No  Post-menopausal bleeding: No  Depression: Yes  Trouble sleeping: Yes  Mood changes: Yes  Panic attacks: Yes

## 2021-06-30 NOTE — LETTER
6/30/2021       RE: Vivian Carvalho  [address] [facility] [address]     Dear Colleague,    Thank you for referring your patient, Vivian Carvalho, to the [facility]UROLOG[facility] [place] at [facility]. Please see a copy of my visit note below.    ASSESSMENT AND PLAN:  Chronic Headache appear to be migrainous in phenotype and possible genetic in etiology. Non focal neurological exam and no papilledema. It would be not unreasonable to wait with any imaging unless patient doesn't respond treatment or worsening headaches or any new symptoms.     Plan:  A trial of Ajovy for chronic migraine prevention.Side effects-allergic reaction or pain in the injection side or redness in the injection side. Unknown side effects with a long term use. Pregnancy is contraindicated.   Acute migraine headache treatment -stop sumatriptan   A trial of rizatriptan as needed. Do not take it with sumatriptan. Limit use to no more than 9 days per month  May try naproxen 500 mg every 12 hours as needed with food. Limit use to no more than 14 days per month  Stay hydrated  Follow up in 2-3 months or sooner if needed       Subjective   Vivian is a 20 year old who presents for the following health issues -HEADACHE   HPI Headaches onset since 6th grade. Family history-mother, both sisters -chronic migraine headaches.   Headache is in the forehead and pounding and throbbing.   Headache frequency -daily 30 days out of 30 days per month and severe headaches. The same headache pattern for at least 1-2 years. Headaches frontal at baseline less than 5/10 and severe at 5/10 on the numeric pain scale. Associated photo and phonophobia, nausea and vomiting. No visual symptoms. Headaches sometimes keep patient up at night. No weakness or numbness in UE or LE but some tingling at times due to topiramate.   Headache worse -loud noises, lights. Works in the retail so fluorescent lights or  noises  Headaches are better sleeping in the quit area.   Headache worse with activity or moving to fast.   Headache treatment -topiramate 100 mg BID for about a year   Amitriptyline 150 mg caused weight gain  Tried propranolol -caused side effects and did not help  Sertraline   Sumatriptan as needed but takes it almost every day  Tried mom's nurtec -helps a little bit    On Prestiq for depression and has been helpful. Depression is stable. Has been managed by PCP.     Vit D def-on on supplement    DEPO shot for birth control    Sleep Clinic eval-no sleep apnea  Ophthalmology evaluation last June 2020-presumed normal    Has been staying hydrated   Caffeine -occasional and when can afford to drink coffee  Lives with parents and brother and two dogs.     PMH:  migraine  Past Medical History:   Diagnosis Date     Depressive disorder 2019     Uncomplicated asthma 2017     Allergies and medications reviewed    Review of Systems   Constitutional, HEENT, cardiovascular, pulmonary, gi and gu systems are negative, except as otherwise noted.      Objective    There were no vitals taken for this visit.  There is no height or weight on file to calculate BMI.  Physical Exam   GENERAL: healthy, alert and no distress, headache today 5/10  EYES: Eyes grossly normal to inspection, PERRL and conjunctivae and sclerae normal  HENT: ear canals and TM's normal, nose and mouth without ulcers or lesions  NECK: no adenopathy, no asymmetry, masses, or scars and thyroid normal to palpation  CV: regular rate and rhythm, normal S1 S2, no S3 or S4, no murmur, click or rub, no peripheral edema and peripheral pulses strong  MS: no gross musculoskeletal defects noted, no edema  SKIN: no suspicious lesions or rashes  NEURO: Normal strength and tone, sensory exam grossly normal, mentation intact, speech normal, cranial nerves 2-12 intact, DTR's normal and symmetric and Romberg normal  PSYCH: mentation appears normal, affect  normal/bright    Diagnostic Test Results   TSH 3.13  CMP and CBC -normal  Normal glucose     I discussed all my recommendations with Vivian Carvalho who verbalizes understanding and comfortable with the plan.  All of patient's questions were answered from the best of my knowledge.  Patient is in agreement with the plan.     36 minutes spent on the date of the encounter doing chart review, history and exam, documentation and further activities as noted above    RAYNA Leary, CNP [facility]  Headache certified  [facility]      Answers for HPI/ROS submitted by the patient on 6/29/2021   General Symptoms: No  Skin Symptoms: No  HENT Symptoms: No  EYE SYMPTOMS: Yes  HEART SYMPTOMS: Yes  LUNG SYMPTOMS: Yes  INTESTINAL SYMPTOMS: Yes  URINARY SYMPTOMS: Yes  GYNECOLOGIC SYMPTOMS: Yes  BREAST SYMPTOMS: No  SKELETAL SYMPTOMS: Yes  BLOOD SYMPTOMS: No  NERVOUS SYSTEM SYMPTOMS: Yes  MENTAL HEALTH SYMPTOMS: Yes  Vision loss: No  Dry eyes: No  Watery eyes: No  Eye bulging: No  Double vision: No  Flashing of lights: No  Spots: No  Floaters: No  Crossed eyes: No  Tunnel Vision: No  Yellowing of eyes: No  Eye irritation: Yes  Sputum or phlegm: No  Coughing up blood: No  Snoring: Yes  Difficulty breathing on exertion: No  Nighttime Cough: No  Difficulty breathing when lying flat: No  Pain in legs with walking: Yes  Trouble breathing while lying down: No  Fingers or toes appear blue: No  High blood pressure: No  Low blood pressure: No  Fainting: No  Murmurs: No  Pacemaker: No  Varicose veins: No  Edema or swelling: No  Wake up at night with shortness of breath: No  Exercise intolerance: No  Bloating: Yes  Blood in stool: No  Black stools: No  Fecal incontinence: No  Yellowing of skin or eyes: No  Vomit with blood: No  Change in stools: Yes  Trouble holding urine or incontinence: Yes  Increased frequency of urination: Yes  Decreased frequency of urination: No  Frequent nighttime urination: Yes  Bone pain: No  Muscle  cramps: Yes  Muscle weakness: Yes  Joint stiffness: Yes  Bone fracture: No  Trouble with coordination: No  Fainting or black-out spells: No  Memory loss: No  Speech problems: No  Tingling: Yes  Difficulty walking: No  Paralysis: No  Bleeding or spotting between periods: Yes  Heavy or painful periods: No  Irregular periods: No  Hot flashes: No  Vaginal dryness: No  Reduced libido: No  Difficulty with sexual arousal: No  Post-menopausal bleeding: No  Depression: Yes  Trouble sleeping: Yes  Mood changes: Yes  Panic attacks: Yes        Again, thank you for allowing me to participate in the care of your patient.      Sincerely,    RAYNA Rios CNP

## 2021-06-30 NOTE — TELEPHONE ENCOUNTER
Prior Authorization Retail Medication Request    Medication/Dose: Fremanezumab-vfrm (AJOVY) 225 MG/1.5ML SOAJ  ICD code (if different than what is on RX):  G43.719  Previously Tried and Failed:  topiramate 100 mg BID for about a year   Amitriptyline 150 mg caused weight gain  Tried propranolol -caused side effects and did not help  Sertraline   Sumatriptan as needed but takes it almost every day  Tried nurtec -helps a little bit  Rationale:  Chronic migraine    Insurance Name:  [employer]  Insurance ID:  [id number]       Pharmacy Information (if different than what is on RX)  Name:    Phone:

## 2021-07-26 ENCOUNTER — TELEPHONE (OUTPATIENT)
Dept: FAMILY MEDICINE | Facility: CLINIC | Age: 21
End: 2021-07-26

## 2021-07-26 NOTE — TELEPHONE ENCOUNTER
Reason for Call:  Other Depo Shot    Detailed comments: Patient is behind in depo shot and sending a message over to get scheduled. What does she need to do to get up to date so she can get her Depo shots again? Thank you    Phone Number Patient can be reached at: Home number on file [phone] (home)    Best Time: anytime    Can we leave a detailed message on this number? YES    Call taken on 7/26/2021 at 3:58 PM by Andie Bhatti

## 2021-07-26 NOTE — TELEPHONE ENCOUNTER
Contacted patient left voicemail to call clinic back. Patient can get depo until tomorrow. When looking when it was last given.     Maile Mtz MA

## 2021-07-29 NOTE — TELEPHONE ENCOUNTER
Pt is scheduled for a depo injection on Monday, 8/2/21.  Pt aware she will need a upt before injection.  Debbie Quiroga CMA

## 2021-08-04 ENCOUNTER — TELEPHONE (OUTPATIENT)
Dept: OBGYN | Facility: CLINIC | Age: 21
End: 2021-08-04

## 2021-08-09 ENCOUNTER — ALLIED HEALTH/NURSE VISIT (OUTPATIENT)
Dept: FAMILY MEDICINE | Facility: CLINIC | Age: 21
End: 2021-08-09
Payer: COMMERCIAL

## 2021-08-09 DIAGNOSIS — Z30.8 ENCOUNTER FOR OTHER CONTRACEPTIVE MANAGEMENT: Primary | ICD-10-CM

## 2021-08-09 LAB — HCG UR QL: NEGATIVE

## 2021-08-09 PROCEDURE — 81025 URINE PREGNANCY TEST: CPT

## 2021-08-09 PROCEDURE — 96372 THER/PROPH/DIAG INJ SC/IM: CPT | Performed by: PHYSICIAN ASSISTANT

## 2021-08-09 RX ORDER — MEDROXYPROGESTERONE ACETATE 150 MG/ML
150 INJECTION, SUSPENSION INTRAMUSCULAR
Status: COMPLETED | OUTPATIENT
Start: 2021-08-09 | End: 2022-04-19

## 2021-08-09 RX ADMIN — MEDROXYPROGESTERONE ACETATE 150 MG: 150 INJECTION, SUSPENSION INTRAMUSCULAR at 10:25

## 2021-08-09 NOTE — PROGRESS NOTES
Patient had annual in April of 2021 with PCP, Susan Haase. Patient needs Depo today (is overdue for urine pregnancy testing obtained).   Depo ordered for patient based on note from annual physical with PCP as noted above.

## 2021-08-09 NOTE — NURSING NOTE
Clinic Administered Medication Documentation    Administrations This Visit     medroxyPROGESTERone (DEPO-PROVERA) injection 150 mg     Admin Date  08/09/2021 Action  Given Dose  150 mg Route  Intramuscular Site   Administered By  Maile Tsai CMA    Ordering Provider: Vida Lerner PA-C    Patient Supplied?: No                  Depo Provera Documentation    URINE HCG: negative    Depo-Provera Standing Order inclusion/exclusion criteria reviewed.   Patient meets: inclusion criteria     BP: Data Unavailable  LAST PAP/EXAM: No results found for: PAP    Prior to injection, verified patient identity using patient's name and date of birth. Medication was administered. Please see MAR and medication order for additional information.     Was entire vial of medication used? Yes  Vial/Syringe: Single dose vial  Expiration Date:  12/2022    Patient instructed to remain in clinic for 15 minutes.  NEXT INJECTION DUE: 10/25/21 - 11/8/21

## 2021-09-29 ENCOUNTER — OFFICE VISIT (OUTPATIENT)
Dept: NEUROLOGY | Facility: CLINIC | Age: 21
End: 2021-09-29
Payer: COMMERCIAL

## 2021-09-29 VITALS
RESPIRATION RATE: 16 BRPM | OXYGEN SATURATION: 97 % | DIASTOLIC BLOOD PRESSURE: 85 MMHG | SYSTOLIC BLOOD PRESSURE: 114 MMHG | HEART RATE: 105 BPM

## 2021-09-29 DIAGNOSIS — G43.109 MIGRAINE WITH AURA AND WITHOUT STATUS MIGRAINOSUS, NOT INTRACTABLE: ICD-10-CM

## 2021-09-29 PROCEDURE — 99212 OFFICE O/P EST SF 10 MIN: CPT | Performed by: NURSE PRACTITIONER

## 2021-09-29 RX ORDER — TOPIRAMATE 50 MG/1
TABLET, FILM COATED ORAL
Qty: 120 TABLET | Refills: 3 | Status: SHIPPED | OUTPATIENT
Start: 2021-09-29 | End: 2022-02-08

## 2021-09-29 ASSESSMENT — PAIN SCALES - GENERAL: PAINLEVEL: MILD PAIN (3)

## 2021-09-29 NOTE — PROGRESS NOTES
Samuel Park is a 21 year old who presents for the following health issues -headache follow up     HPI   Ajovy started July 4th weekend and no side effects. Headaches better and no as many about 50 % improvement.   Break thru headaches -duration at least 5 hours and took rizatriptan and naproxen  -no side effect and helped.   Patient is on topiramate 50 mg BID. If she does not take it-migraine headaches more severe. No side effects.   Discussed a trial of increasing topiramate to 75 mg am and 75 mg at bedtime for one week  Than 100 mg am and 100 mg at bedtime if tolerated.     Plan:  Discussed a trial of increasing topiramate to 75 mg am and 75 mg at bedtime for one week  Than 100 mg am and 100 mg at bedtime if tolerated.   Continue Ajovy  Rescue treatment continue Rizatriptan and naproxen   Follow up in 3 months virtual or sooner if needed     Trigger points/myofacial tenderness over shoulders, traps-may benefit from PT -TENS unit trials, try a gentle stretching exercise       Review of Systems   Constitutional, HEENT, cardiovascular, pulmonary, gi and gu systems are negative, except as otherwise noted.    Allergies and current prescription medications reviewed        Objective    /85   Pulse 105   Resp 16   SpO2 97%   There is no height or weight on file to calculate BMI.  Physical Exam   TP   GENERAL: healthy, alert and no distress  EYES: Eyes grossly normal to inspection, PERRL and conjunctivae and sclerae normal, no apparent papilledema  NECK: no adenopathy, no asymmetry, masses, or scars and thyroid normal to palpation  RESP: lungs clear to auscultation - no rales, rhonchi or wheezes  CV: regular rate and rhythm, normal S1 S2, no S3 or S4, no murmur, click or rub, no peripheral edema and peripheral pulses strong  MS: no gross musculoskeletal defects noted, no edema  NEURO: Normal strength and tone, sensory exam grossly normal, mentation intact, speech normal, cranial nerves 2-12 intact and  normal gait, DTR symmetrical brisk.   PSYCH: mentation appears normal, affect normal/bright    I discussed all my recommendations with Vivian Carvalho who verbalizes understanding and comfortable with the plan.  All of patient's questions were answered from the best of my knowledge.  Patient is in agreement with the plan.     17 minutes spent on the date of the encounter doing video access, chart  review, exam, meds review, treatment plan, documentation and further activities as noted above    RAYNA Leary, CNP [facility]  Headache certified  [facility]

## 2021-09-29 NOTE — LETTER
9/29/2021       RE: Vivian Carvalho  [address]  [facility] [address]     Dear Colleague,    Thank you for referring your patient, Vivian Carvalho, to the [facility]UROLOGY [facility] [place] at [facility]. Please see a copy of my visit note below.      Subjective   Vivian is a 21 year old who presents for the following health issues -headache follow up     HPI   Ajovy started July 4th weekend and no side effects. Headaches better and no as many about 50 % improvement.   Break thru headaches -duration at least 5 hours and took rizatriptan and naproxen  -no side effect and helped.   Patient is on topiramate 50 mg BID. If she does not take it-migraine headaches more severe. No side effects.   Discussed a trial of increasing topiramate to 75 mg am and 75 mg at bedtime for one week  Than 100 mg am and 100 mg at bedtime if tolerated.     Plan:  Discussed a trial of increasing topiramate to 75 mg am and 75 mg at bedtime for one week  Than 100 mg am and 100 mg at bedtime if tolerated.   Continue Ajovy  Rescue treatment continue Rizatriptan and naproxen   Follow up in 3 months virtual or sooner if needed     Trigger points/myofacial tenderness over shoulders, traps-may benefit from PT -TENS unit trials, try a gentle stretching exercise       Review of Systems   Constitutional, HEENT, cardiovascular, pulmonary, gi and gu systems are negative, except as otherwise noted.    Allergies and current prescription medications reviewed        Objective    /85   Pulse 105   Resp 16   SpO2 97%   There is no height or weight on file to calculate BMI.  Physical Exam   TP   GENERAL: healthy, alert and no distress  EYES: Eyes grossly normal to inspection, PERRL and conjunctivae and sclerae normal, no apparent papilledema  NECK: no adenopathy, no asymmetry, masses, or scars and thyroid normal to palpation  RESP: lungs clear to auscultation - no rales, rhonchi or  wheezes  CV: regular rate and rhythm, normal S1 S2, no S3 or S4, no murmur, click or rub, no peripheral edema and peripheral pulses strong  MS: no gross musculoskeletal defects noted, no edema  NEURO: Normal strength and tone, sensory exam grossly normal, mentation intact, speech normal, cranial nerves 2-12 intact and normal gait, DTR symmetrical brisk.   PSYCH: mentation appears normal, affect normal/bright    I discussed all my recommendations with Vivian Carvalho who verbalizes understanding and comfortable with the plan.  All of patient's questions were answered from the best of my knowledge.  Patient is in agreement with the plan.     17 minutes spent on the date of the encounter doing video access, chart  review, exam, meds review, treatment plan, documentation and further activities as noted above    RAYAN Leary, CNP [facility]  Headache certified  [facility]            Again, thank you for allowing me to participate in the care of your patient.      Sincerely,    RAYNA Rios CNP

## 2021-09-29 NOTE — PATIENT INSTRUCTIONS
Plan:  Discussed a trial of increasing topiramate to 75 mg am and 75 mg at bedtime for one week  Than 100 mg am and 100 mg at bedtime if tolerated.   Continue Ajovy  Rescue treatment continue Rizatriptan and naproxen   Follow up in 3 months or sooner if needed     Trigger points/myofacial tenderness over shoulders, traps-may benefit from PT -TENS unit trials, try a gentle stretching exercise

## 2021-10-03 ENCOUNTER — HEALTH MAINTENANCE LETTER (OUTPATIENT)
Age: 21
End: 2021-10-03

## 2021-10-11 ENCOUNTER — VIRTUAL VISIT (OUTPATIENT)
Dept: FAMILY MEDICINE | Facility: CLINIC | Age: 21
End: 2021-10-11
Payer: COMMERCIAL

## 2021-10-11 DIAGNOSIS — E55.9 VITAMIN D DEFICIENCY: Primary | ICD-10-CM

## 2021-10-11 DIAGNOSIS — F32.1 CURRENT MODERATE EPISODE OF MAJOR DEPRESSIVE DISORDER WITHOUT PRIOR EPISODE (H): ICD-10-CM

## 2021-10-11 PROCEDURE — 99214 OFFICE O/P EST MOD 30 MIN: CPT | Mod: 95 | Performed by: NURSE PRACTITIONER

## 2021-10-11 RX ORDER — DESVENLAFAXINE 100 MG/1
100 TABLET, EXTENDED RELEASE ORAL DAILY
Qty: 90 TABLET | Refills: 0 | Status: SHIPPED | OUTPATIENT
Start: 2021-10-11 | End: 2021-12-14

## 2021-10-11 ASSESSMENT — COLUMBIA-SUICIDE SEVERITY RATING SCALE - C-SSRS
2. IN THE PAST MONTH, HAVE YOU ACTUALLY HAD ANY THOUGHTS OF KILLING YOURSELF?: NO
1. WITHIN THE PAST MONTH, HAVE YOU WISHED YOU WERE DEAD OR WISHED YOU COULD GO TO SLEEP AND NOT WAKE UP?: NO
6. HAVE YOU EVER DONE ANYTHING, STARTED TO DO ANYTHING, OR PREPARED TO DO ANYTHING TO END YOUR LIFE?: NO

## 2021-10-11 ASSESSMENT — PATIENT HEALTH QUESTIONNAIRE - PHQ9
10. IF YOU CHECKED OFF ANY PROBLEMS, HOW DIFFICULT HAVE THESE PROBLEMS MADE IT FOR YOU TO DO YOUR WORK, TAKE CARE OF THINGS AT HOME, OR GET ALONG WITH OTHER PEOPLE: SOMEWHAT DIFFICULT
SUM OF ALL RESPONSES TO PHQ QUESTIONS 1-9: 25
SUM OF ALL RESPONSES TO PHQ QUESTIONS 1-9: 25

## 2021-10-11 NOTE — PROGRESS NOTES
Vivian is a 21 year old who is being evaluated via a billable video visit.      How would you like to obtain your AVS? MyChart  If the video visit is dropped, the invitation should be resent by: Text to cell phone: [phone]  Will anyone else be joining your video visit? No     Video Start Time:1532    Assessment & Plan     Current moderate episode of major depressive disorder without prior episode (H):  PHQ 9 of 25, has thoughts of harming self. Denies plan to harm self, describe thoughts as passive, reports she would never harm herself. Will increase prist to 100 mg per day, felt better after starting pristiq 50 mg.  Is also interested in counseling, unsure about insurance, etc, spoke with Counselor, Andie who will reach out to Vivian.     - desvenlafaxine (PRISTIQ) 100 MG 24 hr tablet  Dispense: 90 tablet; Refill: 0    Vitamin D deficiency: vitamin D low at 13 in 4/2021, took vit D as prescribed for 3 months, need to return for recheck, will set this up for 11/1.          Depression Screening Follow Up    PHQ 10/11/2021   PHQ-9 Total Score 25   Q9: Thoughts of better off dead/self-harm past 2 weeks Nearly every day   F/U: Thoughts of suicide or self-harm Yes   F/U: Self harm-plan No   F/U: Self-harm action No   F/U: Safety concerns No     Last PHQ-9 10/11/2021   1.  Little interest or pleasure in doing things 3   2.  Feeling down, depressed, or hopeless 3   3.  Trouble falling or staying asleep, or sleeping too much 3   4.  Feeling tired or having little energy 3   5.  Poor appetite or overeating 3   6.  Feeling bad about yourself 2   7.  Trouble concentrating 2   8.  Moving slowly or restless 3   Q9: Thoughts of better off dead/self-harm past 2 weeks 3   PHQ-9 Total Score 25   Difficulty at work, home, or with people -   In the past two weeks have you had thoughts of suicide or self harm? Yes   Do you have concerns about your personal safety or the safety of others? No   In the past 2 weeks have you  thought about a plan or had intention to harm yourself? No   In the past 2 weeks have you acted on these thoughts in any way? No       Return in about 2 months (around 12/11/2021) for Routine Visit, depression.    Susan Haase, APRN [facility] MECHELLE Park is a 21 year old who presents for the following health issues     History of Present Illness       Mental Health Follow-up:  Patient presents to follow-up on Depression.Patient's depression since last visit has been:  Worse  The patient is not having other symptoms associated with depression.      Any significant life events: job concerns and financial concerns  Patient is not feeling anxious or having panic attacks.  Patient has no concerns about alcohol or drug use.     Social History  Tobacco Use    Smoking status: Never Smoker    Smokeless tobacco: Never Used  Alcohol use: Not Currently    Alcohol/week: 0.0 standard drinks  Drug use: Never      Today's PHQ-9         PHQ-9 Total Score:     (P) 25   PHQ-9 Q9 Thoughts of better off dead/self-harm past 2 weeks :   (P) Nearly every day   Thoughts of suicide or self harm:  (P) Yes   Self-harm Plan:    (P) No   Self-harm Action:      (P) No   Safety concerns for self or others: (P) No         She eats 2-3 servings of fruits and vegetables daily.She consumes 4 sweetened beverage(s) daily.She exercises with enough effort to increase her heart rate 9 or less minutes per day.  She exercises with enough effort to increase her heart rate 3 or less days per week.   She is taking medications regularly.     Answers for HPI/ROS submitted by the patient on 10/11/2021  If you checked off any problems, how difficult have these problems made it for you to do your work, take care of things at home, or get along with other people?: Somewhat difficult  PHQ 9 of 25, has thoughts of harming self. Denies plan to harm self, describe thoughts as passive, reports she would never harm herself.   Is  taking a shower every day, attending work as scheduled.  When she gets home from work she crawls into bed to watch TV, no motivation to do things outside of work.    Is taking pristiq daily as prescribed, felt that it was working well when first started taking the medication in 4/2021.    Has not been in counseling due to money and time.  PHQ9 TOTAL SCORE: 25  Was promoted to  at her job. Feels that her job is increasing her stress due to demands of co workers and customers.  Also has financial stress due to credit card debt. She is working at least 40 hours per week.          Review of Systems   CONSTITUTIONAL: NEGATIVE for fever, chills, change in weight  RESP: NEGATIVE for significant cough or SOB  CV: NEGATIVE for chest pain, palpitations or peripheral edema  PSYCHIATRIC: see HPI      Objective           Vitals:  No vitals were obtained today due to virtual visit.    Physical Exam   GENERAL: Healthy, alert and no distress  RESP: No audible wheeze, cough, or visible cyanosis.  No visible retractions or increased work of breathing.    PSYCH: Mentation appears normal, affect normal/bright, judgement and insight intact, normal speech and appearance well-groomed.            Video-Visit Details    Type of service:  Video Visit    Video End Time:0306    Originating Location (pt. Location): Home    Distant Location (provider location):  [facility]     Platform used for Video Visit: [id number]

## 2021-10-12 ENCOUNTER — TELEPHONE (OUTPATIENT)
Dept: BEHAVIORAL HEALTH | Facility: CLINIC | Age: 21
End: 2021-10-12

## 2021-10-12 ASSESSMENT — PATIENT HEALTH QUESTIONNAIRE - PHQ9: SUM OF ALL RESPONSES TO PHQ QUESTIONS 1-9: 25

## 2021-10-12 NOTE — TELEPHONE ENCOUNTER
Writer phoned patient by PCP request. Spoke with patient, explaining [facility] services.  She is unsure of her work schedule so couldn't schedule, but did express interest in doing so.    Pt reports she would like to schedule after she gets to work today and sees her schedule.    Writer sent [employer] message with [employer] Access Intake number.    ZORAIDA Esposito, [facility]  Behavioral Health Clinician

## 2021-10-20 ENCOUNTER — VIRTUAL VISIT (OUTPATIENT)
Dept: BEHAVIORAL HEALTH | Facility: CLINIC | Age: 21
End: 2021-10-20
Payer: COMMERCIAL

## 2021-10-20 DIAGNOSIS — F50.819 BINGE EATING DISORDER: Primary | ICD-10-CM

## 2021-10-20 PROCEDURE — 90834 PSYTX W PT 45 MINUTES: CPT | Mod: 95 | Performed by: SOCIAL WORKER

## 2021-10-20 ASSESSMENT — ANXIETY QUESTIONNAIRES
5. BEING SO RESTLESS THAT IT IS HARD TO SIT STILL: SEVERAL DAYS
6. BECOMING EASILY ANNOYED OR IRRITABLE: NEARLY EVERY DAY
GAD7 TOTAL SCORE: 17
8. IF YOU CHECKED OFF ANY PROBLEMS, HOW DIFFICULT HAVE THESE MADE IT FOR YOU TO DO YOUR WORK, TAKE CARE OF THINGS AT HOME, OR GET ALONG WITH OTHER PEOPLE?: VERY DIFFICULT
7. FEELING AFRAID AS IF SOMETHING AWFUL MIGHT HAPPEN: NEARLY EVERY DAY
GAD7 TOTAL SCORE: 17
7. FEELING AFRAID AS IF SOMETHING AWFUL MIGHT HAPPEN: NEARLY EVERY DAY
1. FEELING NERVOUS, ANXIOUS, OR ON EDGE: NEARLY EVERY DAY
2. NOT BEING ABLE TO STOP OR CONTROL WORRYING: MORE THAN HALF THE DAYS
4. TROUBLE RELAXING: NEARLY EVERY DAY
3. WORRYING TOO MUCH ABOUT DIFFERENT THINGS: MORE THAN HALF THE DAYS
GAD7 TOTAL SCORE: 17

## 2021-10-20 ASSESSMENT — PATIENT HEALTH QUESTIONNAIRE - PHQ9
SUM OF ALL RESPONSES TO PHQ QUESTIONS 1-9: 24
10. IF YOU CHECKED OFF ANY PROBLEMS, HOW DIFFICULT HAVE THESE PROBLEMS MADE IT FOR YOU TO DO YOUR WORK, TAKE CARE OF THINGS AT HOME, OR GET ALONG WITH OTHER PEOPLE: VERY DIFFICULT
SUM OF ALL RESPONSES TO PHQ QUESTIONS 1-9: 24

## 2021-10-20 NOTE — PROGRESS NOTES
"[facility] Primary Care: Integrated Behavioral Health  October 20, 2021    Behavioral Health Clinician Progress Note    Patient Name: Vivian Carvalho        Service Type:  Individual      Service Location:   MyChart / Email (patient reached) Virtual     Session Start Time: 1:09 p.m.  Session End Time: 2:00 p.m.      Session Length: 38 - 52      Attendees: Client    Visit Activities (Refresh list every visit): NEW and [facility] Only    Diagnostic Assessment Date: next session  Treatment Plan Review Date: 10/20/2021  PHQ and MORTEZA Review Date: 11/20/21  CGI Review Date: 1/20/22  See Flowsheets for today's PHQ-9 and MORTEZA-7 results  Previous PHQ-9:   PHQ-9 SCORE 6/8/2021 10/11/2021 10/20/2021   PHQ-9 Total Score [facility] 13 (Moderate depression) 25 (Severe depression) 24 (Severe depression)   PHQ-9 Total Score 13 25 24     Previous MORTEZA-7:   MORTEZA-7 SCORE 4/27/2021 6/8/2021 10/20/2021   Total Score - 8 (mild anxiety) 17 (severe anxiety)   Total Score 15 8 17       LESLYE LEVEL:  No flowsheet data found.    DATA  Extended Session (60+ minutes): No  Interactive Complexity: No  Crisis: No  [facility] Patient: No    Treatment Objective(s) Addressed in This Session:  Target Behavior(s): depression    Depressed Mood: Increase interest, engagement, and pleasure in doing things  Decrease frequency and intensity of feeling down, depressed, hopeless  Feel less tired and more energy during the day   Identify negative self-talk and behaviors: challenge core beliefs, myths, and actions  Decrease thoughts that you'd be better off dead or of suicide / self-harm    Current Stressors / Issues:  Pt works at [employer] and states she doesn't like her job and feels \"trapped\" there because they're busy with holiday season approaching and doesn't want to leave them with no help.  She admits that if she weren't working at this job, she thinks she wouldn't feel so bad.  She reports between her job stress and financial " "stressors, she doesn't have the time to do anything that she'd like, such as taking her dog for a walk.      Mental Health Hx:  Pt had a therapist she saw in high school for 2-3 months for depression and anxiety.  She found it helped and tried to return to this therapist, but she is no longer at that clinic.  She is currently taking Pristiq for her depressive symptoms.  In the past she took Zoloft \"for a little while\" and found it largely unhelpful.  Pt denies any hospitalizations, civil commitments or other mental health treatments.     Social Hx:  Pt reports she was raised by her parents, has a brother who is 19 and 3 half-siblings that are from her mom; they all are from former partners of her mom and have different fathers.  She does have a relationship with her oldest sister who comes to visit several times a week but that she has a dx of Bipolar Disorder and \"doesn't take criticism very well\" and they don't get along.  Pt has another sister and brother that come over to visit sometimes but isn't close to them, either.    Pt went to [employer] and reportedly was \"ok\" at school with A's and B's; she had some friends, but they were more \"acquaintances... they never invited me to anything, so I never went to prom or school dances and I didn't want to invite myself.\"  Pt reports she lost touch with them after high school.  Pt reports living with her parents, brother, and two dogs.      Pt spends time watching tv and likes to try and train her dog, Brenda.   Asked if she had three wishes that would come true, pt replied that she would not have financial stress, would spend more time with her dog and \"not eat my stress away.\"     Discussing symptoms, pt meets criteria for binge-eating disorder; she will oftentimes order food for delivery that is unhealthy and \"a two person meal,\" feels badly about this fact, feels little to no control over this behavior, eats this way on a daily basis, eats until she is physically " "ill, and feels depressed about her behavior.      Progress on Treatment Objective(s) / Homework:  na/- first session    Motivational Interviewing    MI Intervention: Expressed Empathy/Understanding, Open-ended questions, Change talk (evoked) and Reframe     Change Talk Expressed by the Patient: Activation    Provider Response to Change Talk: A - Affirmed patient's thoughts, decisions, or attempts at behavior change and R - Reflected patient's change talk    Care Plan review completed: Yes    Medication Review:  Changes to psychiatric medications, see updated Medication List in EPIC.  pt has doubled dosage of Pristiq to 100 mgs for one week and thinks it may be helping a little    Medication Compliance:  Yes    Changes in Health Issues:   Yes: Pain, Associated Psychological Distress Gets migraines and when she does, they last all day; frequency is almost daily but recent medication changes has decreased them to 2-3 times weekly.  She lays in bed when she has them, takes naps, \"literally do nothing.\"      Chemical Use Review:   Substance Use: Chemical use reviewed, no active concerns identified  Pt reports she drinks at dinner- something like a megan- with her parents if they go out to eat.     Tobacco Use: No current tobacco use.      Assessment: Current Emotional / Mental Status (status of significant symptoms):  Risk status (Self / Other harm or suicidal ideation)  Patient denies a history of suicidal ideation, suicide attempts, self-injurious behavior, homicidal ideation, homicidal behavior and and other safety concerns and Pt reports she doesn't actually want to harm herself and has no plan or intent.  She has passive thoughts of things like not wanting to be here.  Patient denies current fears or concerns for personal safety.  Patient denies current or recent suicidal ideation or behaviors.  Patient denies current or recent homicidal ideation or behaviors.  Patient denies current or recent self injurious " behavior or ideation.  Patient denies other safety concerns.  A safety and risk management plan has not been developed at this time, however patient was encouraged to call [place] [phone] should there be a change in any of these risk factors.    Appearance:   Appropriate   Eye Contact:   Good   Psychomotor Behavior: Normal   Attitude:   Cooperative   Orientation:   All  Speech   Rate / Production: Normal/ Responsive   Volume:  Normal   Mood:    Normal  Affect:    Appropriate   Thought Content:  Clear   Thought Form:  Coherent  Logical   Insight:    Fair     Diagnoses:  No diagnosis found.    Collateral Reports Completed:  PHQ and MORTEZA    Plan: (Homework, other):  Patient was given information about behavioral services and encouraged to schedule a follow up appointment with the clinic [facility] in 2 weeks.  She was also given information about mental health symptoms and treatment options  as far as evaluation for eating disorder.  CD Recommendations: No indications of CD issues.  ZORAIDA Richey, [facility]    ______________________________________________________________________    Integrated Primary Care Behavioral Health Treatment Plan    Patient's Name: Vivian Carvalho  YOB: 2000    Date: 10/20/2021    DSM-V Diagnoses: 307.51 Binge-Eating Disorder  Psychosocial / Contextual Factors: Inadequate social support, financial stressors, migraines  WHODAS: 45    Referral / Collaboration:  longer-term therapy.    Anticipated number of session or this episode of care: 3-5      MeasurableTreatment Goal(s) related to diagnosis / functional impairment(s)  Goal 1: Patient will decrease binge eating    I will know I've met my goal when eat a normal amount, not a two-person meal.      Objective #A (Patient Action)    Patient will cooperate with a nutritional evaluation and follow recommendations  Status: New - Date: 10/20/21     Intervention(s)  [facility] will make a referral to nutritional specialist that understands  binge eating disorder.    Patient has reviewed and agreed to the above plan.      Andie Simmons, [facility]  October 20, 2021

## 2021-10-21 ASSESSMENT — PATIENT HEALTH QUESTIONNAIRE - PHQ9: SUM OF ALL RESPONSES TO PHQ QUESTIONS 1-9: 24

## 2021-10-21 ASSESSMENT — ANXIETY QUESTIONNAIRES: GAD7 TOTAL SCORE: 17

## 2021-10-25 ENCOUNTER — VIRTUAL VISIT (OUTPATIENT)
Dept: BEHAVIORAL HEALTH | Facility: CLINIC | Age: 21
End: 2021-10-25
Payer: COMMERCIAL

## 2021-10-25 DIAGNOSIS — F50.819 BINGE EATING DISORDER: Primary | ICD-10-CM

## 2021-10-25 PROCEDURE — 90834 PSYTX W PT 45 MINUTES: CPT | Mod: 95 | Performed by: SOCIAL WORKER

## 2021-10-25 NOTE — PROGRESS NOTES
"[facility] Primary Care: Integrated Behavioral Health  10/25/2021    Behavioral Health Clinician Progress Note    Patient Name: Vivian Carvalho        Service Type:  Individual      Service Location:   MyChart / Email (patient reached) Virtual     Session Start Time: 4:03 p.m.  Session End Time: 4:45 p.m.      Session Length: 38 - 52      Attendees: Client    Visit Activities (Refresh list every visit): [facility] Only    Diagnostic Assessment Date: next session  Treatment Plan Review Date: 1/20/22  PHQ and MORTEZA Review Date: 11/20/21  CGI Review Date: 1/20/22  See Flowsheets for today's PHQ-9 and MORTEZA-7 results  Previous PHQ-9:   PHQ-9 SCORE 6/8/2021 10/11/2021 10/20/2021   PHQ-9 Total Score [facility] 13 (Moderate depression) 25 (Severe depression) 24 (Severe depression)   PHQ-9 Total Score 13 25 24     Previous MORTEZA-7:   MORTEZA-7 SCORE 4/27/2021 6/8/2021 10/20/2021   Total Score - 8 (mild anxiety) 17 (severe anxiety)   Total Score 15 8 17       LESLYE LEVEL:  No flowsheet data found.    DATA  Extended Session (60+ minutes): No  Interactive Complexity: No  Crisis: No  [facility] Patient: No    Treatment Objective(s) Addressed in This Session:  Target Behavior(s): depression    Depressed Mood: Increase interest, engagement, and pleasure in doing things  Decrease frequency and intensity of feeling down, depressed, hopeless  Feel less tired and more energy during the day   Identify negative self-talk and behaviors: challenge core beliefs, myths, and actions  Decrease thoughts that you'd be better off dead or of suicide / self-harm    Current Stressors / Issues:  Pt reports her eating has been more \"normal\" amounts since she travelled to her grandparents' home and eats what her grandma does at those times.  Pt describes her eating behaviors- she eats in her bedroom, not with her parents, stress eating, boredom eating,   Be more mindful when eating, eat in the kitchen with no media.  Pt and writer brainstormed some " "readily-available activities in which to engage when feeling down or anxious.   She may put them in a bucket and will try coloring, a tennis ball and frisbee for Brenda, chewing gum, herbal tea bags and mug, list of musicals, poetry books, squishmallow, silly putty or play dough.       Pt denies experiencing SI, partially because she hasn't been at work.  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      Progress on Treatment Objective(s) / Homework:  na/- first session    Motivational Interviewing    MI Intervention: Expressed Empathy/Understanding, Open-ended questions, Change talk (evoked) and Reframe     Change Talk Expressed by the Patient: Activation    Provider Response to Change Talk: A - Affirmed patient's thoughts, decisions, or attempts at behavior change and R - Reflected patient's change talk    Care Plan review completed: Yes    Medication Review:  Changes to psychiatric medications, see updated Medication List in EPIC.  pt has doubled dosage of Pristiq to 100 mgs for over a  week and thinks it may be helping a little    Medication Compliance:  Yes    Changes in Health Issues:   Yes: Pain, Associated Psychological Distress Gets migraines and when she does, they last all day; frequency is almost daily but recent medication changes has decreased them to 2-3 times weekly.  She lays in bed when she has them, takes naps, \"literally do nothing.\"      Chemical Use Review:   Substance Use: Chemical use reviewed, no active concerns identified  Pt reports she drinks at dinner- something like a megan- with her parents if they go out to eat.     Tobacco Use: No current tobacco use.      Assessment: Current Emotional / Mental Status (status of significant symptoms):  Risk status (Self / Other harm or suicidal ideation)  Patient denies a history of suicidal ideation, suicide attempts, self-injurious behavior, homicidal ideation, " "homicidal behavior and and other safety concerns and Pt reports she doesn't actually want to harm herself and has no plan or intent.  She has passive thoughts of things like not wanting to be here.  Patient denies current fears or concerns for personal safety.  Patient denies current or recent suicidal ideation or behaviors.  Patient denies current or recent homicidal ideation or behaviors.  Patient denies current or recent self injurious behavior or ideation.  Patient denies other safety concerns.  A safety and risk management plan has not been developed at this time, however patient was encouraged to call [place] [phone] should there be a change in any of these risk factors.    Appearance:   Appropriate   Eye Contact:   Good   Psychomotor Behavior: Normal   Attitude:   Cooperative   Orientation:   All  Speech   Rate / Production: Normal/ Responsive   Volume:  Normal   Mood:    Normal  Affect:    Appropriate   Thought Content:  Clear   Thought Form:  Coherent  Logical   Insight:    Fair     Diagnoses:  No diagnosis found.    Collateral Reports Completed:  PHQ and MORTEZA    Plan: (Homework, other):  Patient was given information about behavioral services and encouraged to schedule a follow up appointment with the clinic [facility] in 1 week.  She was also given information about mental health symptoms and treatment options  as far as evaluation for eating disorder.  Pt was sent Ember messages about referrals for therapist as well as coping skills \"bucket.  CD Recommendations: No indications of CD issues.  ZORAIDA Richey, LICSW    ______________________________________________________________________    Integrated Primary Care Behavioral Health Treatment Plan    Patient's Name: Vivian Carvalho  YOB: 2000    Date: 10/20/2021    DSM-V Diagnoses: 307.51 Binge-Eating Disorder  Psychosocial / Contextual Factors: Inadequate social support, financial stressors, migraines  WHODAS: 45    Referral / " Collaboration:  longer-term therapy.    Anticipated number of session or this episode of care: 3-5      MeasurableTreatment Goal(s) related to diagnosis / functional impairment(s)  Goal 1: Patient will decrease binge eating    I will know I've met my goal when eat a normal amount, not a two-person meal.      Objective #A (Patient Action)    Patient will cooperate with a nutritional evaluation and follow recommendations  Status: New - Date: 10/20/21     Intervention(s)  [facility] will make a referral to nutritional specialist that understands binge eating disorder.    Patient has reviewed and agreed to the above plan.      Andie Simmons, [facility]  October 20, 2021

## 2021-11-01 ENCOUNTER — ALLIED HEALTH/NURSE VISIT (OUTPATIENT)
Dept: FAMILY MEDICINE | Facility: CLINIC | Age: 21
End: 2021-11-01
Payer: COMMERCIAL

## 2021-11-01 ENCOUNTER — LAB (OUTPATIENT)
Dept: LAB | Facility: CLINIC | Age: 21
End: 2021-11-01

## 2021-11-01 DIAGNOSIS — Z30.8 ENCOUNTER FOR OTHER CONTRACEPTIVE MANAGEMENT: Primary | ICD-10-CM

## 2021-11-01 DIAGNOSIS — E55.9 VITAMIN D DEFICIENCY: Primary | ICD-10-CM

## 2021-11-01 DIAGNOSIS — E55.9 VITAMIN D DEFICIENCY: ICD-10-CM

## 2021-11-01 LAB — DEPRECATED CALCIDIOL+CALCIFEROL SERPL-MC: 12 UG/L (ref 20–75)

## 2021-11-01 PROCEDURE — 96372 THER/PROPH/DIAG INJ SC/IM: CPT | Performed by: PHYSICIAN ASSISTANT

## 2021-11-01 PROCEDURE — 36415 COLL VENOUS BLD VENIPUNCTURE: CPT

## 2021-11-01 PROCEDURE — 99207 PR NO CHARGE NURSE ONLY: CPT

## 2021-11-01 PROCEDURE — 82306 VITAMIN D 25 HYDROXY: CPT

## 2021-11-01 RX ORDER — ERGOCALCIFEROL 1.25 MG/1
50000 CAPSULE, LIQUID FILLED ORAL
Qty: 12 CAPSULE | Refills: 1 | Status: SHIPPED | OUTPATIENT
Start: 2021-11-01 | End: 2022-01-18

## 2021-11-01 RX ORDER — MEDROXYPROGESTERONE ACETATE 150 MG/ML
150 INJECTION, SUSPENSION INTRAMUSCULAR
Status: ACTIVE | OUTPATIENT
Start: 2021-11-01

## 2021-11-01 RX ADMIN — MEDROXYPROGESTERONE ACETATE 150 MG: 150 INJECTION, SUSPENSION INTRAMUSCULAR at 08:09

## 2021-11-01 NOTE — PROGRESS NOTES
Follow Up Injection    Patient returning during stated date range given at previous visit: Yes      If here at the correct interval:   BP Readings from Last 1 Encounters:   09/29/21 114/85     Wt Readings from Last 1 Encounters:   06/08/21 125.2 kg (276 lb)       Last Pap/exam date:       Side effects or problems with last injection?  No.  Date range is given to patient for next dose: 01/17/2021---01/31/2021    See Medication Note for administration information    Staff Sig: Mckayla Samaniego/LEANN  [place]

## 2021-12-14 ENCOUNTER — VIRTUAL VISIT (OUTPATIENT)
Dept: FAMILY MEDICINE | Facility: CLINIC | Age: 21
End: 2021-12-14
Payer: COMMERCIAL

## 2021-12-14 VITALS — HEIGHT: 64 IN | WEIGHT: 275 LBS | BODY MASS INDEX: 46.95 KG/M2

## 2021-12-14 DIAGNOSIS — F32.1 CURRENT MODERATE EPISODE OF MAJOR DEPRESSIVE DISORDER WITHOUT PRIOR EPISODE (H): ICD-10-CM

## 2021-12-14 PROCEDURE — 99213 OFFICE O/P EST LOW 20 MIN: CPT | Mod: 95 | Performed by: NURSE PRACTITIONER

## 2021-12-14 RX ORDER — DESVENLAFAXINE 100 MG/1
100 TABLET, EXTENDED RELEASE ORAL DAILY
Qty: 90 TABLET | Refills: 1 | Status: SHIPPED | OUTPATIENT
Start: 2021-12-14 | End: 2022-06-20

## 2021-12-14 ASSESSMENT — MIFFLIN-ST. JEOR: SCORE: 1997.39

## 2021-12-14 ASSESSMENT — ANXIETY QUESTIONNAIRES
5. BEING SO RESTLESS THAT IT IS HARD TO SIT STILL: NOT AT ALL
2. NOT BEING ABLE TO STOP OR CONTROL WORRYING: SEVERAL DAYS
7. FEELING AFRAID AS IF SOMETHING AWFUL MIGHT HAPPEN: SEVERAL DAYS
6. BECOMING EASILY ANNOYED OR IRRITABLE: NOT AT ALL
GAD7 TOTAL SCORE: 4
1. FEELING NERVOUS, ANXIOUS, OR ON EDGE: SEVERAL DAYS
3. WORRYING TOO MUCH ABOUT DIFFERENT THINGS: SEVERAL DAYS
IF YOU CHECKED OFF ANY PROBLEMS ON THIS QUESTIONNAIRE, HOW DIFFICULT HAVE THESE PROBLEMS MADE IT FOR YOU TO DO YOUR WORK, TAKE CARE OF THINGS AT HOME, OR GET ALONG WITH OTHER PEOPLE: SOMEWHAT DIFFICULT

## 2021-12-14 ASSESSMENT — PATIENT HEALTH QUESTIONNAIRE - PHQ9
5. POOR APPETITE OR OVEREATING: NOT AT ALL
SUM OF ALL RESPONSES TO PHQ QUESTIONS 1-9: 4

## 2021-12-14 NOTE — PROGRESS NOTES
Vivian is a 21 year old who is being evaluated via a billable video visit.      How would you like to obtain your AVS? MyChart  If the video visit is dropped, the invitation should be resent by: Text to cell phone: [phone]  Will anyone else be joining your video visit? No  Video Start Time: 1052    Assessment & Plan     Current moderate episode of major depressive disorder without prior episode (H): PHQ 9 of 4, much better. Continue on pristiq 100 mg daily.    - desvenlafaxine (PRISTIQ) 100 MG 24 hr tablet  Dispense: 90 tablet; Refill: 1      Return in about 5 months (around 5/14/2022) for Physical Exam.    Susan Haase, APRN [facility]   Vivian is a 21 year old who presents for the following health issues     HPI     Depression Followup    How are you doing with your depression since your last visit? Improved     Are you having other symptoms that might be associated with depression? No    Have you had a significant life event?  No     Are you feeling anxious or having panic attacks?   No    Do you have any concerns with your use of alcohol or other drugs? No    Social History     Tobacco Use     Smoking status: Never Smoker     Smokeless tobacco: Never Used   Vaping Use     Vaping Use: Never used   Substance Use Topics     Alcohol use: Yes     Comment: occ     Drug use: Never     PHQ 10/11/2021 10/20/2021 12/14/2021   PHQ-9 Total Score 25 24 4   Q9: Thoughts of better off dead/self-harm past 2 weeks Nearly every day Nearly every day Not at all   F/U: Thoughts of suicide or self-harm Yes Yes -   F/U: Self harm-plan No No -   F/U: Self-harm action No No -   F/U: Safety concerns No No -     MORTEZA-7 SCORE 6/8/2021 10/20/2021 12/14/2021   Total Score 8 (mild anxiety) 17 (severe anxiety) -   Total Score 8 17 4     Last PHQ-9 12/14/2021   1.  Little interest or pleasure in doing things 1   2.  Feeling down, depressed, or hopeless 0   3.  Trouble falling or staying  asleep, or sleeping too much 3   4.  Feeling tired or having little energy 0   5.  Poor appetite or overeating 0   6.  Feeling bad about yourself 0   7.  Trouble concentrating 0   8.  Moving slowly or restless 0   Q9: Thoughts of better off dead/self-harm past 2 weeks 0   PHQ-9 Total Score 4   Difficulty at work, home, or with people Somewhat difficult   In the past two weeks have you had thoughts of suicide or self harm? -   Do you have concerns about your personal safety or the safety of others? -   In the past 2 weeks have you thought about a plan or had intention to harm yourself? -   In the past 2 weeks have you acted on these thoughts in any way? -     MORTEZA-7  12/14/2021   1. Feeling nervous, anxious, or on edge 1   2. Not being able to stop or control worrying 1   3. Worrying too much about different things 1   4. Trouble relaxing 0   5. Being so restless that it is hard to sit still 0   6. Becoming easily annoyed or irritable 0   7. Feeling afraid, as if something awful might happen 1   MORTEZA-7 Total Score 4   If you checked any problems, how difficult have they made it for you to do your work, take care of things at home, or get along with other people? Somewhat difficult     Working at a [employer], likes this job much better.  Dog day get away.   At the last visit increased pristiq to 100 mg daily.    PHQ 9 of 4 today, denies thoughts of harming self or others.  Feels the decrease in depression is due to new job and medication.    Review of Systems   CONSTITUTIONAL: NEGATIVE for fever, chills, change in weight  RESP: NEGATIVE for significant cough or SOB  CV: NEGATIVE for chest pain, palpitations or peripheral edema  PSYCHIATRIC: see HPI      Objective           Vitals:  No vitals were obtained today due to virtual visit.    Physical Exam   GENERAL: Healthy, alert and no distress  RESP: No audible wheeze, cough, or visible cyanosis.  No visible retractions or increased work of breathing.    SKIN: Visible  skin clear. No significant rash, abnormal pigmentation or lesions.  NEURO: Cranial nerves grossly intact.  Mentation and speech appropriate for age.  PSYCH: Mentation appears normal, affect normal/bright, judgement and insight intact, normal speech and appearance well-groomed.          Video-Visit Details    Type of service:  Video Visit    Video End Time:1101    Originating Location (pt. Location): Home    Distant Location (provider location):  [facility]     Platform used for Video Visit: Goodie Goodie App

## 2021-12-15 ASSESSMENT — ANXIETY QUESTIONNAIRES: GAD7 TOTAL SCORE: 4

## 2022-01-04 ENCOUNTER — VIRTUAL VISIT (OUTPATIENT)
Dept: NEUROLOGY | Facility: CLINIC | Age: 22
End: 2022-01-04
Payer: COMMERCIAL

## 2022-01-04 DIAGNOSIS — G43.709 CHRONIC MIGRAINE WITHOUT AURA WITHOUT STATUS MIGRAINOSUS, NOT INTRACTABLE: Primary | ICD-10-CM

## 2022-01-04 PROCEDURE — 99212 OFFICE O/P EST SF 10 MIN: CPT | Mod: 95 | Performed by: NURSE PRACTITIONER

## 2022-01-04 ASSESSMENT — HEADACHE IMPACT TEST (HIT 6)
HOW OFTEN HAVE YOU FELT TOO TIRED TO WORK BECAUSE OF YOUR HEADACHES: NEVER
HOW OFTEN HAVE YOU FELT FED UP OR IRRITATED BECAUSE OF YOUR HEADACHES: RARELY
WHEN YOU HAVE HEADACHES HOW OFTEN IS THE PAIN SEVERE: RARELY
HOW OFTEN DO HEADACHES LIMIT YOUR DAILY ACTIVITIES: NEVER
HOW OFTEN DID HEADACHS LIMIT CONCENTRATION ON WORK OR DAILY ACTIVITY: RARELY
HIT6 TOTAL SCORE: 42
WHEN YOU HAVE A HEADACHE HOW OFTEN DO YOU WISH YOU COULD LIE DOWN: NEVER

## 2022-01-04 NOTE — LETTER
1/4/2022       RE: Vivian Carvalho  [address] [facility] [address] [facility] [place] [address]     Dear Colleague,    Thank you for referring your patient, Vivian Carvalho, to the [facility] [place] [facility]. Please see a copy of my visit note below.    Last Patient-Answered HIT-6 Questionnaire  HIT-6 1/4/2022   When you have headaches, how often is the pain severe 8   How often do headaches limit your ability to do usual daily activities including household work, work, school, or social activities? 6   When you have a headache, how often do you wish you could lie down? 6   In the past 4 weeks, how often have you felt too tired to do work or daily activities because of your headaches 6   In the past 4 weeks, how often have you felt fed up or irritated because of your headaches 8   In the past 4 weeks, how often did headaches limit your ability to concentrate on work or daily activities 8   HIT-6 Total Score 42       MIGRAINE DISABILITY ASSESSMENT (MIDAS)    On how many days in the last 3 months did you miss work or school because of your headaches?  0    How many days in the last 3 months was your productivity at work or school reduced by half or more because of your headaches? (Do not include days you counted in question 1 where you missed work or school.)  2    On how many days in the last 3 months did you not do household work (such as housework, home repairs and maintenance, shopping, caring for children and relatives) because of your headaches?  0    How many days in the last 3 months was your productivity in household work reduced by half or more because of your headaches? (Do not include days you counted in question 3 where you did not do household work).  2    On how many days in the last 3 months did you miss family, social, or lesiure activities because of your headaches?  0    MIDAS Total Score:     On how many days in the last 3 months did  you have a headache? 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   Headaches lasted until gets to her meds and medication kicks in. Takes rizatriptan +naproxen -takes one of each of them and headache goes away.   Ajovy IM since September -has been helping and no side effects, headaches infrequent now.   COVID19 before Ranjana +family and doing well now. Patient is fully vaccinated.     Plan:  Continue Ajovy and topiramate for headache prevention  Rescue -rizatriptan and naproxen as needed.   Follow up in 6-9 months or sooner if needed if headaches worsen or any new symptoms.     Patient is alert and no in apparent acute distress,  mentation appears normal, judgement and insight intact, normal speech.    I discussed all my recommendations with Vivian Carvalho who verbalizes understanding and comfortable with the plan.  All of patient's questions were answered from the best of my knowledge.  Patient is in agreement with the plan.     10 minutes spent on the date of the encounter doing video access,results review,  meds review, treatment plan, documentation and further activities as noted above      RAYNA Leary, CNP [facility]  Headache certified  [facility]

## 2022-01-04 NOTE — PATIENT INSTRUCTIONS
Plan:  Continue Ajovy and topiramate for headache prevention  Rescue -rizatriptan and naproxen as needed.   Follow up in 6-9 months or sooner if needed if headaches worsen or any new symptoms.

## 2022-01-04 NOTE — NURSING NOTE
Patient verified meds and allergies are correct via patients echeck in.    Jennifer Saavedra, Virtual Facilitator

## 2022-01-04 NOTE — PROGRESS NOTES
Vivian is a 21 year old who is being evaluated via a billable video visit.      How would you like to obtain your AVS? Ramonahart  If the video visit is dropped, the invitation should be resent by: Text to cell phone: [phone]  Will anyone else be joining your video visit? No      Video Start Time: 11:15 AM  Video-Visit Details    Type of service:  Video Visit    Video End Time:11:24 AM    Originating Location (pt. Location): Home    Distant Location (provider location):  [facility] NEUROLOGY [facility]     Platform used for Video Visit: Star[employer] SoftwareWell    Last Patient-Answered HIT-6 Questionnaire  HIT-6 1/4/2022   When you have headaches, how often is the pain severe 8   How often do headaches limit your ability to do usual daily activities including household work, work, school, or social activities? 6   When you have a headache, how often do you wish you could lie down? 6   In the past 4 weeks, how often have you felt too tired to do work or daily activities because of your headaches 6   In the past 4 weeks, how often have you felt fed up or irritated because of your headaches 8   In the past 4 weeks, how often did headaches limit your ability to concentrate on work or daily activities 8   HIT-6 Total Score 42       MIGRAINE DISABILITY ASSESSMENT (MIDAS)    On how many days in the last 3 months did you miss work or school because of your headaches?  0    How many days in the last 3 months was your productivity at work or school reduced by half or more because of your headaches? (Do not include days you counted in question 1 where you missed work or school.)  2    On how many days in the last 3 months did you not do household work (such as housework, home repairs and maintenance, shopping, caring for children and relatives) because of your headaches?  0    How many days in the last 3 months was your productivity in household work reduced by half or more because of your headaches? (Do not include days you  counted in question 3 where you did not do household work).  2    On how many days in the last 3 months did you miss family, social, or lesiure activities because of your headaches?  0    MIDAS Total Score:     On how many days in the last 3 months did you have a headache? 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   Headaches lasted until gets to her meds and medication kicks in. Takes rizatriptan +naproxen -takes one of each of them and headache goes away.   Ajovy IM since September -has been helping and no side effects, headaches infrequent now.   COVID19 before [place] +family and doing well now. Patient is fully vaccinated.     Plan:  Continue Ajovy and topiramate for headache prevention  Rescue -rizatriptan and naproxen as needed.   Follow up in 6-9 months or sooner if needed if headaches worsen or any new symptoms.     Patient is alert and no in apparent acute distress,  mentation appears normal, judgement and insight intact, normal speech.    I discussed all my recommendations with Vivian Carvalho who verbalizes understanding and comfortable with the plan.  All of patient's questions were answered from the best of my knowledge.  Patient is in agreement with the plan.     10 minutes spent on the date of the encounter doing video access,results review,  meds review, treatment plan, documentation and further activities as noted above    RAYNA Leary, CNP [facility]  Headache certified  [facility]

## 2022-02-07 DIAGNOSIS — G43.709 CHRONIC MIGRAINE WITHOUT AURA: ICD-10-CM

## 2022-02-07 NOTE — TELEPHONE ENCOUNTER
Rx Authorization:  Requested Medication/ Dose Topiramate 50 MG Oral Tablet  Date last refill ordered: 9/29/21  Quantity ordered: 120 tabs  # refills: 3  Date of last clinic visit with ordering provider: 1/21/21  Date of next clinic visit with ordering provider: 10/4/22  All pertinent protocol data (lab date/result):   Include pertinent information from patients message:

## 2022-02-08 DIAGNOSIS — G43.709 CHRONIC MIGRAINE WITHOUT AURA WITHOUT STATUS MIGRAINOSUS, NOT INTRACTABLE: Primary | ICD-10-CM

## 2022-02-08 RX ORDER — TOPIRAMATE 50 MG/1
TABLET, FILM COATED ORAL
Qty: 120 TABLET | Refills: 5 | Status: SHIPPED | OUTPATIENT
Start: 2022-02-08 | End: 2022-02-08

## 2022-02-08 RX ORDER — TOPIRAMATE 50 MG/1
TABLET, FILM COATED ORAL
Qty: 120 TABLET | Refills: 5 | Status: SHIPPED | OUTPATIENT
Start: 2022-02-08 | End: 2022-10-04 | Stop reason: DRUGHIGH

## 2022-02-17 PROBLEM — K21.9 GASTROESOPHAGEAL REFLUX DISEASE: Status: ACTIVE | Noted: 2019-04-22

## 2022-04-19 ENCOUNTER — ALLIED HEALTH/NURSE VISIT (OUTPATIENT)
Dept: FAMILY MEDICINE | Facility: CLINIC | Age: 22
End: 2022-04-19
Payer: COMMERCIAL

## 2022-04-19 DIAGNOSIS — Z30.42 ENCOUNTER FOR SURVEILLANCE OF INJECTABLE CONTRACEPTIVE: Primary | ICD-10-CM

## 2022-04-19 LAB — HCG UR QL: NEGATIVE

## 2022-04-19 PROCEDURE — 99207 PR NO CHARGE NURSE ONLY: CPT

## 2022-04-19 PROCEDURE — 96372 THER/PROPH/DIAG INJ SC/IM: CPT | Performed by: PHYSICIAN ASSISTANT

## 2022-04-19 PROCEDURE — 81025 URINE PREGNANCY TEST: CPT

## 2022-04-19 RX ADMIN — MEDROXYPROGESTERONE ACETATE 150 MG: 150 INJECTION, SUSPENSION INTRAMUSCULAR at 11:25

## 2022-04-19 NOTE — PROGRESS NOTES
Follow Up Injection    Patient returning during stated date range given at previous visit: No, urine pregnancy test performed, results neg    If here at the correct interval:   BP Readings from Last 1 Encounters:   09/29/21 114/85     Wt Readings from Last 1 Encounters:   12/14/21 124.7 kg (275 lb)       Last Pap/exam date: NA      Side effects or problems with last injection?  No.  Date range is given to patient for next dose: 7/5-7/19    See Medication Note for administration information    Staff Sig: Maile Miller CMA

## 2022-06-14 ENCOUNTER — TELEPHONE (OUTPATIENT)
Dept: NEUROLOGY | Facility: CLINIC | Age: 22
End: 2022-06-14

## 2022-06-14 NOTE — TELEPHONE ENCOUNTER
Prior Authorization Retail Medication Request     Medication/Dose: Fremanezumab-vfrm (AJOVY) 225 MG/1.5ML SOAJ  ICD code (if different than what is on RX):  G43.719  Previously Tried and Failed:  topiramate 100 mg BID for about a year   Amitriptyline 150 mg caused weight gain  Tried propranolol -caused side effects and did not help  Sertraline   Sumatriptan as needed but takes it almost every day  Tried nurtec -helps a little bit  Rationale:  Chronic migraine     Insurance Name:  [employer]  Insurance ID:  [id number]

## 2022-06-16 NOTE — TELEPHONE ENCOUNTER
Central Prior Authorization Team   Phone: [phone]      PA Initiation    Medication: Fremanezumab-vfrm (AJOVY) 225 MG/1.5ML SOAJ  Insurance Company: [employer] ([facility]) - Phone [phone] Fax [phone]  Pharmacy Filling the Rx: [facility] [address] [place] [address] [address] [facility]  Filling Pharmacy Phone: [phone]  Filling Pharmacy Fax:    Start Date: 6/16/2022

## 2022-06-18 DIAGNOSIS — F32.1 CURRENT MODERATE EPISODE OF MAJOR DEPRESSIVE DISORDER WITHOUT PRIOR EPISODE (H): ICD-10-CM

## 2022-06-20 RX ORDER — DESVENLAFAXINE 100 MG/1
TABLET, EXTENDED RELEASE ORAL
Qty: 30 TABLET | Refills: 0 | Status: SHIPPED | OUTPATIENT
Start: 2022-06-20 | End: 2022-07-27

## 2022-06-20 NOTE — TELEPHONE ENCOUNTER
PRIOR AUTHORIZATION DENIED    Medication: Fremanezumab-vfrm (AJOVY) 225 MG/1.5ML SOAJ    Denial Date: 6/20/2022    Denial Rational:             Appeal Information:

## 2022-06-20 NOTE — TELEPHONE ENCOUNTER
Routing request to provider for review/approval because:  Labs not current:  creatinine    Visit is up to date. RN will issue one time 90 day kathleen refill. Please advise on labs.   Henry ANNA RN

## 2022-06-21 NOTE — TELEPHONE ENCOUNTER
Patient actually due for visit (Verena recommended physical in May when patient last seen in December). Please assist patient with getting physical scheduled.

## 2022-07-05 NOTE — TELEPHONE ENCOUNTER
Medication Appeal Initiation      Medication: Fremanezumab-vfrm (AJOVY) 225 MG/1.5ML SOAJ  Appeal Start Date:  7/5/2022  Insurance Company:    Comments:  Appeal has been initiated.  I have faxed original denial letter along with Letter of Medical Necessity (letters tab) to [facility] c/o , fax# [phone] (phone: [phone]). Marked for urgent review.

## 2022-07-06 NOTE — TELEPHONE ENCOUNTER
[facility] Call Center    Phone Message    May a detailed message be left on voicemail: yes     Reason for Call: Other: Rosana from appeals department called to notify clinic that pt medication for Fremanezumab-vfrm (AJOVY) 225 MG/1.5ML SOAJ has been approved and they will fax the approval form.    Action Taken: Other: neurology    Travel Screening: Not Applicable

## 2022-07-06 NOTE — TELEPHONE ENCOUNTER
Called [facility] Appeals at [phone], spoke to Evelia. Who says they faxed in additional questions and called twice yesterday. Apparently they were utilizing the phone/fax on the provider's Letter of Medical Necessity and not my contact info on the fax. I answered the question needed, and now the case is submitted, and is noted as urgent.     Case# [id number].

## 2022-07-08 DIAGNOSIS — G43.719 INTRACTABLE CHRONIC MIGRAINE WITHOUT AURA AND WITHOUT STATUS MIGRAINOSUS: ICD-10-CM

## 2022-07-10 ENCOUNTER — HEALTH MAINTENANCE LETTER (OUTPATIENT)
Age: 22
End: 2022-07-10

## 2022-07-11 ENCOUNTER — ALLIED HEALTH/NURSE VISIT (OUTPATIENT)
Dept: FAMILY MEDICINE | Facility: CLINIC | Age: 22
End: 2022-07-11
Payer: COMMERCIAL

## 2022-07-11 DIAGNOSIS — Z30.42 ENCOUNTER FOR SURVEILLANCE OF INJECTABLE CONTRACEPTIVE: Primary | ICD-10-CM

## 2022-07-11 PROCEDURE — 96372 THER/PROPH/DIAG INJ SC/IM: CPT | Performed by: NURSE PRACTITIONER

## 2022-07-11 PROCEDURE — 99207 PR NO CHARGE NURSE ONLY: CPT

## 2022-07-11 RX ORDER — RIZATRIPTAN BENZOATE 5 MG/1
TABLET, ORALLY DISINTEGRATING ORAL
Qty: 18 TABLET | Refills: 9 | Status: SHIPPED | OUTPATIENT
Start: 2022-07-11 | End: 2023-09-27

## 2022-07-11 RX ADMIN — MEDROXYPROGESTERONE ACETATE 150 MG: 150 INJECTION, SUSPENSION INTRAMUSCULAR at 11:04

## 2022-07-11 NOTE — TELEPHONE ENCOUNTER
Rx Authorization:  Requested Medication/ Dose Maxalt 5mg  Date last refill ordered: 6/30/21  Quantity ordered: 18 tabs  # refills: 6  Date of last clinic visit with ordering provider: 1/4/22  Date of next clinic visit with ordering provider:   All pertinent protocol data (lab date/result):   Include pertinent information from patients message:

## 2022-07-11 NOTE — PROGRESS NOTES
LAST PAP/EXAM: No results found for: PAP  URINE HCG:not indicated    The following medication was given:     MEDICATION: Depo Provera 150mg  ROUTE: IM  SITE: RUQ - Gluts  : [employer].  LOT #: [id number]  EXP:01/30/2024  NEXT INJECTION DUE: 9/26/22 - 10/10/22   Provider: Susan Haase, CNP Jessica Larson, CMA

## 2022-07-25 DIAGNOSIS — F32.1 CURRENT MODERATE EPISODE OF MAJOR DEPRESSIVE DISORDER WITHOUT PRIOR EPISODE (H): ICD-10-CM

## 2022-07-26 NOTE — TELEPHONE ENCOUNTER
June 21, 2022        7:36 AM  Vida Lerner PA-C routed this conversation to Cr Support Silver Team ([place])       Vida Lerner PA-C         7:36 AM  Note  Patient actually due for visit (Verena recommended physical in May when patient last seen in December). Please assist patient with getting physical scheduled.        Routing refill request to provider for review/approval because:  Labs not current:  creatinine  Patient needs to be seen because:  Failing visit    Charu Hurtado RN

## 2022-07-27 RX ORDER — DESVENLAFAXINE 100 MG/1
TABLET, EXTENDED RELEASE ORAL
Qty: 30 TABLET | Refills: 0 | Status: SHIPPED | OUTPATIENT
Start: 2022-07-27 | End: 2022-12-27

## 2022-07-27 NOTE — TELEPHONE ENCOUNTER
When refill given a month ago note routed to team to call patient to get follow up scheduled. Needs visit. Please help her get one.

## 2022-09-02 DIAGNOSIS — G43.719 INTRACTABLE CHRONIC MIGRAINE WITHOUT AURA AND WITHOUT STATUS MIGRAINOSUS: ICD-10-CM

## 2022-09-02 RX ORDER — FREMANEZUMAB-VFRM 225 MG/1.5ML
INJECTION SUBCUTANEOUS
Qty: 1.5 ML | Refills: 11 | Status: SHIPPED | OUTPATIENT
Start: 2022-09-02 | End: 2023-11-27

## 2022-09-02 NOTE — TELEPHONE ENCOUNTER
Rx Authorization:  Requested Medication/ Dose: Ajovy 225 MG/1.5 ML Subcutaneous   Date last refill ordered: 6/30/21   Quantity ordered: 1.5 mL  # refills: 11  Date of last clinic visit with ordering provider: 1/4/22  Date of next clinic visit with ordering provider: 10/4/22  All pertinent protocol data (lab date/result):   Include pertinent information from patients message:

## 2022-09-10 ENCOUNTER — HEALTH MAINTENANCE LETTER (OUTPATIENT)
Age: 22
End: 2022-09-10

## 2022-10-03 ENCOUNTER — ALLIED HEALTH/NURSE VISIT (OUTPATIENT)
Dept: FAMILY MEDICINE | Facility: CLINIC | Age: 22
End: 2022-10-03
Payer: COMMERCIAL

## 2022-10-03 DIAGNOSIS — Z30.9 CONTRACEPTIVE MANAGEMENT: Primary | ICD-10-CM

## 2022-10-03 PROCEDURE — 99207 PR NO CHARGE NURSE ONLY: CPT

## 2022-10-03 PROCEDURE — 96372 THER/PROPH/DIAG INJ SC/IM: CPT | Performed by: NURSE PRACTITIONER

## 2022-10-03 RX ADMIN — MEDROXYPROGESTERONE ACETATE 150 MG: 150 INJECTION, SUSPENSION INTRAMUSCULAR at 11:01

## 2022-10-04 ENCOUNTER — VIRTUAL VISIT (OUTPATIENT)
Dept: NEUROLOGY | Facility: CLINIC | Age: 22
End: 2022-10-04
Payer: COMMERCIAL

## 2022-10-04 DIAGNOSIS — G43.709 CHRONIC MIGRAINE WITHOUT AURA WITHOUT STATUS MIGRAINOSUS, NOT INTRACTABLE: Primary | ICD-10-CM

## 2022-10-04 DIAGNOSIS — F32.A DEPRESSION, UNSPECIFIED DEPRESSION TYPE: ICD-10-CM

## 2022-10-04 PROCEDURE — 99212 OFFICE O/P EST SF 10 MIN: CPT | Mod: 95 | Performed by: NURSE PRACTITIONER

## 2022-10-04 RX ORDER — TOPIRAMATE 100 MG/1
100 TABLET, FILM COATED ORAL 2 TIMES DAILY
Qty: 60 TABLET | Refills: 11 | Status: SHIPPED | OUTPATIENT
Start: 2022-10-04 | End: 2023-10-26 | Stop reason: SINTOL

## 2022-10-04 ASSESSMENT — PATIENT HEALTH QUESTIONNAIRE - PHQ9: SUM OF ALL RESPONSES TO PHQ QUESTIONS 1-9: 21

## 2022-10-04 NOTE — LETTER
10/4/2022       RE: Vivian Carvalho  [address] [facility] [address]     Dear Colleague,    Thank you for referring your patient, Vivian Carvalho, to the [facility] [place] [facility]. Please see a copy of my visit note below.    Vivian is a 22 year old who is being evaluated via a billable video visit.      How would you like to obtain your AVS? MyChart  If the video visit is dropped, the invitation should be resent by: Text to cell phone: [phone]  Will anyone else be joining your video visit? No         Vivian is a 22 year old who is being evaluated via a billable video visit.        Subjective   Vivian is a 22 year old presenting for the following health issues:headache   Video Visit and Follow Up      Ajovy helps and a gap for a month -run out and migraine headaches got worse-mid September. Since starting Ajovy may be 8 minor headaches and would get relieved with naproxen or rizatriptan. No side effects with Ajovy or naproxen or rizatriptan.   Patient has been taking topiramate 100 mg BID and tolerates well. On Depo shot and no pregnancy plans at this time. Pregnancy is contraindicated -patient verbalizes understanding. No renal calculi history. No side effects but spacy at times.   Patient is back on Ajovy -just got it a couple of days ago.   No new headache symptoms.     Depression got worse -run out Prestiq and opened to see a mental health provided. No SI thoughts or plan reported today.     Plan:  Continue Ajovy and topiramate for migraine prevention   Rescue treatment -naproxen and/or rizatriptan as needed.   Follow up in 9-12 months or sooner if needed-via Luminescenthart or phone call    Depression-mental health referral provided        Objective    Vitals - Patient Reported  Pain Score: No Pain (0)      Physical Exam   Patient is alert and no in apparent acute distress,  mentation appears normal, judgement and  insight intact, normal speech, face is symmetrical and symmetrical facial expressions, no apparent weakness      I discussed all my recommendations with Vivian Carvalho who verbalizes understanding and comfortable with the plan.  All of patient's questions were answered from the best of my knowledge.  Patient is in agreement with the plan.     15 minutes spent on the date of the encounter doing video access, chart  review,  meds review, treatment plan, documentation and further activities as noted above    RAYNA Leary, CNP [facility]  Headache certified  [facility]        Video-Visit Details    Video Start Time: 11:42 AM    Type of service:  Video Visit    Video End Time:11:52 AM    Originating Location (pt. Location): Home    Distant Location (provider location):  [facility] NEUROLOGY [facility]     Platform used for Video Visit: Daniela            Again, thank you for allowing me to participate in the care of your patient.      Sincerely,    RAYNA Rios CNP

## 2022-10-04 NOTE — PROGRESS NOTES
Vivian is a 22 year old who is being evaluated via a billable video visit.      How would you like to obtain your AVS? MyChart  If the video visit is dropped, the invitation should be resent by: Text to cell phone: [phone]  Will anyone else be joining your video visit? No         Vivian is a 22 year old who is being evaluated via a billable video visit.        Subjective   Vivian is a 22 year old presenting for the following health issues:headache   Video Visit and Follow Up      Ajovy helps and a gap for a month -run out and migraine headaches got worse-mid September. Since starting Ajovy may be 8 minor headaches and would get relieved with naproxen or rizatriptan. No side effects with Ajovy or naproxen or rizatriptan.   Patient has been taking topiramate 100 mg BID and tolerates well. On Depo shot and no pregnancy plans at this time. Pregnancy is contraindicated -patient verbalizes understanding. No renal calculi history. No side effects but spacy at times.   Patient is back on Ajovy -just got it a couple of days ago.   No new headache symptoms.     Depression got worse -run out Prestiq and opened to see a mental health provided. No SI thoughts or plan reported today.     Plan:  Continue Ajovy and topiramate for migraine prevention   Rescue treatment -naproxen and/or rizatriptan as needed.   Follow up in 9-12 months or sooner if needed-via [employer] or phone call    Depression-mental health referral provided         Objective    Vitals - Patient Reported  Pain Score: No Pain (0)      Physical Exam   Patient is alert and no in apparent acute distress,  mentation appears normal, judgement and insight intact, normal speech, face is symmetrical and symmetrical facial expressions, no apparent weakness      I discussed all my recommendations with Vivian MARQUEZ Deven who verbalizes understanding and comfortable with the plan.  All of patient's questions were answered from the best of my knowledge.  Patient is in  agreement with the plan.     15 minutes spent on the date of the encounter doing video access, chart  review,  meds review, treatment plan, documentation and further activities as noted above    RAYNA Leary, CNP [facility]  Headache certified  [facility]        Video-Visit Details    Video Start Time: 11:42 AM    Type of service:  Video Visit    Video End Time:11:52 AM    Originating Location (pt. Location): Home    Distant Location (provider location):  [facility] NEUROLOGY CLINIC [place]     Platform used for Video Visit: deviantART

## 2022-10-04 NOTE — PATIENT INSTRUCTIONS
Plan:  Continue Ajovy and topiramate for migraine prevention   Rescue treatment -naproxen and/or rizatriptan as needed.   Follow up in 9-12 months or sooner if needed-via MyChart or phone call    Depression-mental health referral provided

## 2022-10-04 NOTE — LETTER
Date:October 10, 2022      Provider requested that no letter be sent. Do not send.       [facility]

## 2022-10-05 ENCOUNTER — TELEPHONE (OUTPATIENT)
Dept: NEUROLOGY | Facility: CLINIC | Age: 22
End: 2022-10-05

## 2022-12-16 DIAGNOSIS — G43.719 INTRACTABLE CHRONIC MIGRAINE WITHOUT AURA AND WITHOUT STATUS MIGRAINOSUS: ICD-10-CM

## 2022-12-16 NOTE — TELEPHONE ENCOUNTER
Rx Authorization:  Requested Medication/ Dose: Naproxen 500mg  Date last refill ordered: 6/30/21  Quantity ordered: 30 tabs  # refills: 3  Date of last clinic visit with ordering provider: 10/4/22  Date of next clinic visit with ordering provider: F/U 1 year  All pertinent protocol data (lab date/result):   Include pertinent information from patients message:

## 2022-12-19 RX ORDER — NAPROXEN 500 MG/1
TABLET ORAL
Qty: 30 TABLET | Refills: 3 | Status: SHIPPED | OUTPATIENT
Start: 2022-12-19 | End: 2024-01-29

## 2022-12-27 ENCOUNTER — OFFICE VISIT (OUTPATIENT)
Dept: FAMILY MEDICINE | Facility: CLINIC | Age: 22
End: 2022-12-27
Payer: COMMERCIAL

## 2022-12-27 VITALS
DIASTOLIC BLOOD PRESSURE: 82 MMHG | BODY MASS INDEX: 49.09 KG/M2 | WEIGHT: 286 LBS | TEMPERATURE: 97.4 F | HEART RATE: 86 BPM | OXYGEN SATURATION: 99 % | SYSTOLIC BLOOD PRESSURE: 124 MMHG

## 2022-12-27 DIAGNOSIS — F41.1 GAD (GENERALIZED ANXIETY DISORDER): ICD-10-CM

## 2022-12-27 DIAGNOSIS — K21.9 GASTROESOPHAGEAL REFLUX DISEASE WITHOUT ESOPHAGITIS: ICD-10-CM

## 2022-12-27 DIAGNOSIS — F33.2 SEVERE EPISODE OF RECURRENT MAJOR DEPRESSIVE DISORDER, WITHOUT PSYCHOTIC FEATURES (H): Primary | ICD-10-CM

## 2022-12-27 PROCEDURE — 99214 OFFICE O/P EST MOD 30 MIN: CPT | Performed by: NURSE PRACTITIONER

## 2022-12-27 RX ORDER — FAMOTIDINE 20 MG/1
20 TABLET, FILM COATED ORAL 2 TIMES DAILY
Qty: 60 TABLET | Refills: 0 | Status: SHIPPED | OUTPATIENT
Start: 2022-12-27 | End: 2023-07-11

## 2022-12-27 RX ORDER — VENLAFAXINE HYDROCHLORIDE 37.5 MG/1
37.5 CAPSULE, EXTENDED RELEASE ORAL DAILY
Qty: 7 CAPSULE | Refills: 0 | Status: SHIPPED | OUTPATIENT
Start: 2022-12-27 | End: 2023-02-14 | Stop reason: DRUGHIGH

## 2022-12-27 RX ORDER — VENLAFAXINE HYDROCHLORIDE 75 MG/1
75 CAPSULE, EXTENDED RELEASE ORAL DAILY
Qty: 30 CAPSULE | Refills: 0 | Status: SHIPPED | OUTPATIENT
Start: 2022-12-27 | End: 2023-01-11

## 2022-12-27 ASSESSMENT — PATIENT HEALTH QUESTIONNAIRE - PHQ9
SUM OF ALL RESPONSES TO PHQ QUESTIONS 1-9: 21
SUM OF ALL RESPONSES TO PHQ QUESTIONS 1-9: 21
10. IF YOU CHECKED OFF ANY PROBLEMS, HOW DIFFICULT HAVE THESE PROBLEMS MADE IT FOR YOU TO DO YOUR WORK, TAKE CARE OF THINGS AT HOME, OR GET ALONG WITH OTHER PEOPLE: EXTREMELY DIFFICULT

## 2022-12-27 ASSESSMENT — ANXIETY QUESTIONNAIRES
8. IF YOU CHECKED OFF ANY PROBLEMS, HOW DIFFICULT HAVE THESE MADE IT FOR YOU TO DO YOUR WORK, TAKE CARE OF THINGS AT HOME, OR GET ALONG WITH OTHER PEOPLE?: EXTREMELY DIFFICULT
7. FEELING AFRAID AS IF SOMETHING AWFUL MIGHT HAPPEN: NEARLY EVERY DAY
5. BEING SO RESTLESS THAT IT IS HARD TO SIT STILL: NEARLY EVERY DAY
7. FEELING AFRAID AS IF SOMETHING AWFUL MIGHT HAPPEN: NEARLY EVERY DAY
4. TROUBLE RELAXING: NEARLY EVERY DAY
1. FEELING NERVOUS, ANXIOUS, OR ON EDGE: NEARLY EVERY DAY
GAD7 TOTAL SCORE: 21
GAD7 TOTAL SCORE: 21
3. WORRYING TOO MUCH ABOUT DIFFERENT THINGS: NEARLY EVERY DAY
IF YOU CHECKED OFF ANY PROBLEMS ON THIS QUESTIONNAIRE, HOW DIFFICULT HAVE THESE PROBLEMS MADE IT FOR YOU TO DO YOUR WORK, TAKE CARE OF THINGS AT HOME, OR GET ALONG WITH OTHER PEOPLE: EXTREMELY DIFFICULT
GAD7 TOTAL SCORE: 21
6. BECOMING EASILY ANNOYED OR IRRITABLE: NEARLY EVERY DAY
2. NOT BEING ABLE TO STOP OR CONTROL WORRYING: NEARLY EVERY DAY

## 2022-12-27 NOTE — PROGRESS NOTES
Assessment & Plan     Severe episode of recurrent major depressive disorder, without psychotic features (H)  MORTEZA (generalized anxiety disorder)  Uncontrolled since stopping medication. Benefits from Pristiq when starting medication then didn't feel much benefit. Felt significant increase in symptoms with stopping medication.   Discussed options and patient would like to trial another medication. Venlafaxine started. Discussed medication use, risks, benefits, and side effects.    Passive SI, no plan for harm. Contracts for safety.   Follow-up 1 month; sooner as needed.   - venlafaxine (EFFEXOR XR) 37.5 MG 24 hr capsule  Dispense: 7 capsule; Refill: 0  - venlafaxine (EFFEXOR XR) 75 MG 24 hr capsule  Dispense: 30 capsule; Refill: 0    Gastroesophageal reflux disease without esophagitis  Discussed lifestyle changes. Plan for 2-4 weeks of medication support.   - famotidine (PEPCID) 20 MG tablet  Dispense: 60 tablet; Refill: 0           Depression Screening Follow Up    PHQ 12/27/2022   PHQ-9 Total Score 21   Q9: Thoughts of better off dead/self-harm past 2 weeks Several days   F/U: Thoughts of suicide or self-harm No   F/U: Self harm-plan -   F/U: Self-harm action -   F/U: Safety concerns No     Last PHQ-9 12/27/2022   1.  Little interest or pleasure in doing things 3   2.  Feeling down, depressed, or hopeless 3   3.  Trouble falling or staying asleep, or sleeping too much 3   4.  Feeling tired or having little energy 3   5.  Poor appetite or overeating 3   6.  Feeling bad about yourself 2   7.  Trouble concentrating 1   8.  Moving slowly or restless 2   Q9: Thoughts of better off dead/self-harm past 2 weeks 1   PHQ-9 Total Score 21   Difficulty at work, home, or with people -   In the past two weeks have you had thoughts of suicide or self harm? No   Do you have concerns about your personal safety or the safety of others? No   In the past 2 weeks have you thought about a plan or had intention to harm yourself? -   In  the past 2 weeks have you acted on these thoughts in any way? -         C-SSRS ([place]) 10/11/2021   Within the last month, have you wished you were dead or wished you could go to sleep and not wake up? No   Within the last month, have you had any actual thoughts of killing yourself? No   Within the last month, have you ever done anything, started to do anything, or prepared to do anything to end your life? No         Follow Up      Follow Up Actions Taken  Crisis resource information provided in the After Visit Summary  Patient declined referral.    Discussed the following ways the patient can remain in a safe environment:  be around others      No follow-ups on file.   Follow-up Visit   Expected date:  Jan 27, 2023 (Approximate)      Follow Up Appointment Details:     Follow-up with whom?: Me    Follow-Up for what?: Chronic Disease f/u    Chronic Disease f/u:  Anxiety  Depression       How?: In Person or Virtual                    RAYNA Jovel [facility]    Samuel Park is a 22 year old, presenting for the following health issues:  No chief complaint on file.      HPI     Depression and Anxiety Follow-Up    How are you doing with your depression since your last visit? Worsened stopped medication in 8/2022    How are you doing with your anxiety since your last visit?  Worsened as above    Are you having other symptoms that might be associated with depression or anxiety? Yes:  sleep; restless or sleep to much    Have you had a significant life event? No     Do you have any concerns with your use of alcohol or other drugs? No    Passive SI, no plan.     Social History     Tobacco Use     Smoking status: Never     Smokeless tobacco: Never   Vaping Use     Vaping Use: Never used   Substance Use Topics     Alcohol use: Yes     Comment: occ     Drug use: Never     PHQ 2/21/2022 10/4/2022 12/27/2022   PHQ-9 Total Score 4 21 21   Q9: Thoughts of better off  dead/self-harm past 2 weeks Not at all Not at all Several days   F/U: Thoughts of suicide or self-harm - - No   F/U: Self harm-plan - - -   F/U: Self-harm action - - -   F/U: Safety concerns - - No     MORTEZA-7 SCORE 12/14/2021 2/21/2022 12/27/2022   Total Score - 2 (minimal anxiety) 21 (severe anxiety)   Total Score 4 2 21         Follow Up Actions Taken  Patient declined referral.     Discussed the following ways the patient can remain in a safe environment:  be around others  Suicide Assessment Five-step Evaluation and Treatment (SAFE-T)        Review of Systems   Constitutional, HEENT, cardiovascular, pulmonary, gi and gu systems are negative, except as otherwise noted.      Objective    /82 (BP Location: Left arm, Patient Position: Sitting)   Pulse 86   Temp 97.4  F (36.3  C)   Wt 129.7 kg (286 lb)   LMP  (LMP Unknown)   SpO2 99%   BMI 49.09 kg/m    Body mass index is 49.09 kg/m .  Physical Exam   GENERAL: healthy, alert and no distress  RESP: regular rate and effort  PSYCH: mentation appears normal and anxious                    Answers for HPI/ROS submitted by the patient on 12/27/2022  If you checked off any problems, how difficult have these problems made it for you to do your work, take care of things at home, or get along with other people?: Extremely difficult  PHQ9 TOTAL SCORE: 21  MORTEZA 7 TOTAL SCORE: 21

## 2023-01-02 ENCOUNTER — ALLIED HEALTH/NURSE VISIT (OUTPATIENT)
Dept: FAMILY MEDICINE | Facility: CLINIC | Age: 23
End: 2023-01-02
Payer: COMMERCIAL

## 2023-01-02 DIAGNOSIS — Z30.9 CONTRACEPTIVE MANAGEMENT: Primary | ICD-10-CM

## 2023-01-02 PROCEDURE — 99207 PR NO CHARGE NURSE ONLY: CPT

## 2023-01-02 NOTE — PROGRESS NOTES
Clinic Administered Medication Documentation          Depo Provera Documentation    URINE HCG: not indicated    Depo-Provera Standing Order inclusion/exclusion criteria reviewed.   Patient meets: inclusion criteria     BP: Data Unavailable  LAST PAP/EXAM: No results found for: PAP    Prior to injection, verified patient identity using patient's name and date of birth. Medication was administered. Please see MAR and medication order for additional information.     Was entire vial of medication used? Yes  Vial/Syringe: Single dose vial  Expiration Date:  04/24    Patient instructed to remain in clinic for 15 minutes.  NEXT INJECTION DUE: 3/20/23 - 4/3/23  Mary Bai, CMA

## 2023-01-04 DIAGNOSIS — F41.1 GAD (GENERALIZED ANXIETY DISORDER): ICD-10-CM

## 2023-01-04 DIAGNOSIS — F33.2 SEVERE EPISODE OF RECURRENT MAJOR DEPRESSIVE DISORDER, WITHOUT PSYCHOTIC FEATURES (H): ICD-10-CM

## 2023-01-05 RX ORDER — VENLAFAXINE HYDROCHLORIDE 37.5 MG/1
CAPSULE, EXTENDED RELEASE ORAL
Qty: 7 CAPSULE | Refills: 0 | OUTPATIENT
Start: 2023-01-05

## 2023-01-11 ENCOUNTER — MYC REFILL (OUTPATIENT)
Dept: FAMILY MEDICINE | Facility: CLINIC | Age: 23
End: 2023-01-11

## 2023-01-11 DIAGNOSIS — F33.2 SEVERE EPISODE OF RECURRENT MAJOR DEPRESSIVE DISORDER, WITHOUT PSYCHOTIC FEATURES (H): ICD-10-CM

## 2023-01-11 DIAGNOSIS — F41.1 GAD (GENERALIZED ANXIETY DISORDER): ICD-10-CM

## 2023-01-13 RX ORDER — VENLAFAXINE HYDROCHLORIDE 75 MG/1
75 CAPSULE, EXTENDED RELEASE ORAL DAILY
Qty: 30 CAPSULE | Refills: 0 | Status: SHIPPED | OUTPATIENT
Start: 2023-01-13 | End: 2023-02-14

## 2023-01-13 RX ORDER — VENLAFAXINE HYDROCHLORIDE 37.5 MG/1
37.5 CAPSULE, EXTENDED RELEASE ORAL DAILY
Qty: 7 CAPSULE | Refills: 0 | Status: SHIPPED | OUTPATIENT
Start: 2023-01-13 | End: 2023-02-14 | Stop reason: DRUGHIGH

## 2023-01-13 NOTE — TELEPHONE ENCOUNTER
Routing refill request to provider for review/approval because:     Last ordered: 2 weeks ago by RAYNA Jovel CNP     Patient comment: I did the 37.5 mg dosage for the 7 days and now I have been off it for almost two weeks now because the 75mg dosage was never sent to my pharmacy, so I don't know if you want me to restart to 37.5 MG but I would like both dosages sent if I need to restart.    Serotonin-Norepinephrine Reuptake Inhibitors  Failed 01/11/2023 09:49 AM   Protocol Details  PHQ-9 score of less than 5 in past 6 months    Normal serum creatinine on file in past 12 months        PHQ 2/21/2022 10/4/2022 12/27/2022   PHQ-9 Total Score 4 21 21   Q9: Thoughts of better off dead/self-harm past 2 weeks Not at all Not at all Several days   F/U: Thoughts of suicide or self-harm - - No   F/U: Self harm-plan - - -   F/U: Self-harm action - - -   F/U: Safety concerns - - No     Creatinine   Date Value Ref Range Status   04/27/2021 0.82 0.52 - 1.04 mg/dL Final     Irlanda Ernandez, Registered Nurse  [facility]

## 2023-02-14 ENCOUNTER — OFFICE VISIT (OUTPATIENT)
Dept: FAMILY MEDICINE | Facility: CLINIC | Age: 23
End: 2023-02-14
Payer: COMMERCIAL

## 2023-02-14 VITALS
SYSTOLIC BLOOD PRESSURE: 132 MMHG | DIASTOLIC BLOOD PRESSURE: 88 MMHG | OXYGEN SATURATION: 96 % | TEMPERATURE: 97.7 F | HEIGHT: 64 IN | BODY MASS INDEX: 48.36 KG/M2 | HEART RATE: 118 BPM | WEIGHT: 283.3 LBS | RESPIRATION RATE: 14 BRPM

## 2023-02-14 DIAGNOSIS — F33.2 SEVERE EPISODE OF RECURRENT MAJOR DEPRESSIVE DISORDER, WITHOUT PSYCHOTIC FEATURES (H): Primary | ICD-10-CM

## 2023-02-14 DIAGNOSIS — L98.9 SCALP LESION: ICD-10-CM

## 2023-02-14 DIAGNOSIS — F41.1 GAD (GENERALIZED ANXIETY DISORDER): ICD-10-CM

## 2023-02-14 PROCEDURE — 99214 OFFICE O/P EST MOD 30 MIN: CPT | Performed by: NURSE PRACTITIONER

## 2023-02-14 RX ORDER — VENLAFAXINE HYDROCHLORIDE 75 MG/1
75 CAPSULE, EXTENDED RELEASE ORAL DAILY
Qty: 90 CAPSULE | Refills: 1 | Status: SHIPPED | OUTPATIENT
Start: 2023-02-14 | End: 2023-07-11

## 2023-02-14 ASSESSMENT — PATIENT HEALTH QUESTIONNAIRE - PHQ9
10. IF YOU CHECKED OFF ANY PROBLEMS, HOW DIFFICULT HAVE THESE PROBLEMS MADE IT FOR YOU TO DO YOUR WORK, TAKE CARE OF THINGS AT HOME, OR GET ALONG WITH OTHER PEOPLE: SOMEWHAT DIFFICULT
SUM OF ALL RESPONSES TO PHQ QUESTIONS 1-9: 6
SUM OF ALL RESPONSES TO PHQ QUESTIONS 1-9: 6

## 2023-02-14 ASSESSMENT — ANXIETY QUESTIONNAIRES
GAD7 TOTAL SCORE: 6
GAD7 TOTAL SCORE: 6
6. BECOMING EASILY ANNOYED OR IRRITABLE: SEVERAL DAYS
8. IF YOU CHECKED OFF ANY PROBLEMS, HOW DIFFICULT HAVE THESE MADE IT FOR YOU TO DO YOUR WORK, TAKE CARE OF THINGS AT HOME, OR GET ALONG WITH OTHER PEOPLE?: NOT DIFFICULT AT ALL
GAD7 TOTAL SCORE: 6
7. FEELING AFRAID AS IF SOMETHING AWFUL MIGHT HAPPEN: SEVERAL DAYS
5. BEING SO RESTLESS THAT IT IS HARD TO SIT STILL: SEVERAL DAYS
7. FEELING AFRAID AS IF SOMETHING AWFUL MIGHT HAPPEN: SEVERAL DAYS
2. NOT BEING ABLE TO STOP OR CONTROL WORRYING: SEVERAL DAYS
IF YOU CHECKED OFF ANY PROBLEMS ON THIS QUESTIONNAIRE, HOW DIFFICULT HAVE THESE PROBLEMS MADE IT FOR YOU TO DO YOUR WORK, TAKE CARE OF THINGS AT HOME, OR GET ALONG WITH OTHER PEOPLE: NOT DIFFICULT AT ALL
3. WORRYING TOO MUCH ABOUT DIFFERENT THINGS: SEVERAL DAYS
1. FEELING NERVOUS, ANXIOUS, OR ON EDGE: SEVERAL DAYS
4. TROUBLE RELAXING: NOT AT ALL

## 2023-02-14 NOTE — PROGRESS NOTES
"  Assessment & Plan     Severe episode of recurrent major depressive disorder, without psychotic features (H)  MORTEZA (generalized anxiety disorder)  Mood well controlled with venlafaxine. Tolerates medication well.   Will continue as current.   - venlafaxine (EFFEXOR XR) 75 MG 24 hr capsule  Dispense: 90 capsule; Refill: 1    Scalp lesion  Wart like lesion at scalp hair parting. Discussed treatment option, would like to consult Dermatology for possible preservation of hair to area.   Refer.   - Adult Dermatology Referral               BMI:   Estimated body mass index is 48.63 kg/m  as calculated from the following:    Height as of this encounter: 1.626 m (5' 4\").    Weight as of this encounter: 128.5 kg (283 lb 4.8 oz).           No follow-ups on file.    RAYNA Jovel [facility]    Samuel Park is a 22 year old presenting for the following health issues:  Depression and Anxiety      History of Present Illness       Mental Health Follow-up:  Patient presents to follow-up on Depression & Anxiety.Patient's depression since last visit has been:  Good  The patient is not having other symptoms associated with depression.  Patient's anxiety since last visit has been:  Better  The patient is not having other symptoms associated with anxiety.  Any significant life events: No  Patient is not feeling anxious or having panic attacks.  Patient has no concerns about alcohol or drug use.    She eats 0-1 servings of fruits and vegetables daily.She consumes 2 sweetened beverage(s) daily.She exercises with enough effort to increase her heart rate 9 or less minutes per day.  She exercises with enough effort to increase her heart rate 3 or less days per week. She is missing 1 dose(s) of medications per week.  She is not taking prescribed medications regularly due to remembering to take.    Today's PHQ-9         PHQ-9 Total Score: 6    PHQ-9 Q9 Thoughts of better off dead/self-harm past 2 " "weeks :   Not at all    How difficult have these problems made it for you to do your work, take care of things at home, or get along with other people: Somewhat difficult  Today's MORTEZA-7 Score: 6     Feeling much improved with venlafaxine.   Sleep is good.   Denies SI.   Tolerating medication well.     PHQ 10/4/2022 12/27/2022 2/14/2023   PHQ-9 Total Score 21 21 6   Q9: Thoughts of better off dead/self-harm past 2 weeks Not at all Several days Not at all   F/U: Thoughts of suicide or self-harm - No -   F/U: Self harm-plan - - -   F/U: Self-harm action - - -   F/U: Safety concerns - No -     MORTEZA-7 SCORE 2/21/2022 12/27/2022 2/14/2023   Total Score 2 (minimal anxiety) 21 (severe anxiety) 6 (mild anxiety)   Total Score 2 21 6           Review of Systems   Constitutional, HEENT, cardiovascular, pulmonary, gi and gu systems are negative, except as otherwise noted.      Objective    /88 (BP Location: Right arm, Patient Position: Sitting, Cuff Size: Adult Large)   Pulse 118   Temp 97.7  F (36.5  C) (Oral)   Resp 14   Ht 1.626 m (5' 4\")   Wt 128.5 kg (283 lb 4.8 oz)   LMP  (LMP Unknown)   SpO2 96%   BMI 48.63 kg/m    Body mass index is 48.63 kg/m .  Physical Exam   GENERAL: healthy, alert and no distress  SKIN: wart like lesion to crown of head at hair parting.   PSYCH: mentation appears normal, affect normal/bright                    "

## 2023-03-20 ENCOUNTER — ALLIED HEALTH/NURSE VISIT (OUTPATIENT)
Dept: FAMILY MEDICINE | Facility: CLINIC | Age: 23
End: 2023-03-20
Payer: COMMERCIAL

## 2023-03-20 DIAGNOSIS — Z30.9 CONTRACEPTIVE MANAGEMENT: Primary | ICD-10-CM

## 2023-03-20 PROCEDURE — 99207 PR NO CHARGE NURSE ONLY: CPT

## 2023-03-20 PROCEDURE — 96372 THER/PROPH/DIAG INJ SC/IM: CPT | Performed by: NURSE PRACTITIONER

## 2023-03-20 RX ADMIN — MEDROXYPROGESTERONE ACETATE 150 MG: 150 INJECTION, SUSPENSION INTRAMUSCULAR at 10:04

## 2023-03-20 NOTE — PROGRESS NOTES
Follow Up Injection    Patient returning during stated date range given at previous visit: Yes    If here at the correct interval:   BP Readings from Last 1 Encounters:   02/14/23 132/88     Wt Readings from Last 1 Encounters:   02/14/23 128.5 kg (283 lb 4.8 oz)     Side effects or problems with last injection?  No.  Date range is given to patient for next dose: 6/5/23 - 6/19/23     See Medication Note for administration information    Staff Sig: Maile Miller CMA

## 2023-03-28 ENCOUNTER — E-VISIT (OUTPATIENT)
Dept: FAMILY MEDICINE | Facility: CLINIC | Age: 23
End: 2023-03-28
Payer: COMMERCIAL
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
## 2023-03-28 PROCEDURE — 99421 OL DIG E/M SVC 5-10 MIN: CPT | Performed by: NURSE PRACTITIONER

## 2023-03-29 RX ORDER — DOXYCYCLINE 100 MG/1
100 CAPSULE ORAL 2 TIMES DAILY
Qty: 14 CAPSULE | Refills: 0 | Status: SHIPPED | OUTPATIENT
Start: 2023-03-29 | End: 2023-04-05

## 2023-03-29 NOTE — TELEPHONE ENCOUNTER
Provider E-Visit time total (minutes): 10    Wound visualized in person in public area.  Wound to left forearm with abrasion to skin and hematoma. ROM well intact.   Discussed supportive cares.   Recommended prophylactic antibiotic   Dog is known and vaccinated.   RAYNA Jovel CNP on 3/29/2023 at 7:23 AM

## 2023-03-29 NOTE — PATIENT INSTRUCTIONS
Thank you for choosing us for your care. I have placed an order for a prescription so that you can start treatment. View your full visit summary for details by clicking on the link below. Your pharmacist will able to address any questions you may have about the medication.     If you're not feeling better within 5-7 days, please schedule an appointment.  You can schedule an appointment right here in [facility], or call [phone]  If the visit is for the same symptoms as your eVisit, we'll refund the cost of your eVisit if seen within seven days.

## 2023-06-16 ENCOUNTER — ALLIED HEALTH/NURSE VISIT (OUTPATIENT)
Dept: FAMILY MEDICINE | Facility: CLINIC | Age: 23
End: 2023-06-16
Payer: COMMERCIAL

## 2023-06-16 DIAGNOSIS — Z30.9 CONTRACEPTIVE MANAGEMENT: Primary | ICD-10-CM

## 2023-06-16 PROCEDURE — 96372 THER/PROPH/DIAG INJ SC/IM: CPT | Performed by: NURSE PRACTITIONER

## 2023-06-16 RX ADMIN — MEDROXYPROGESTERONE ACETATE 150 MG: 150 INJECTION, SUSPENSION INTRAMUSCULAR at 11:10

## 2023-06-22 ENCOUNTER — TELEPHONE (OUTPATIENT)
Dept: NEUROLOGY | Facility: CLINIC | Age: 23
End: 2023-06-22
Payer: COMMERCIAL

## 2023-06-22 NOTE — TELEPHONE ENCOUNTER
Prior Authorization Retail Medication Request     Medication/Dose: Fremanezumab-vfrm (AJOVY) 225 MG/1.5ML SOAJ  ICD code (if different than what is on RX):  G43.719  Previously Tried and Failed:  topiramate 100 mg BID for about a year   Amitriptyline 150 mg caused weight gain  Tried propranolol -caused side effects and did not help  Sertraline   Sumatriptan as needed but takes it almost every day  Tried nurtec -helps a little bit  Rationale:  Chronic migraine     Insurance Name:  [employer]  Insurance ID:  [id number]

## 2023-06-28 NOTE — TELEPHONE ENCOUNTER
Prior Authorization Approval    Medication: AJOVY 225 MG/1.5ML SC SOAJ  Authorization Effective Date: 6/28/2023  Authorization Expiration Date: 6/28/2024  Approved Dose/Quantity:   Reference #:     Insurance Company: [employer] ([facility]) - Phone [phone] Fax [phone]  Expected CoPay:       CoPay Card Available:      Financial Assistance Needed:   [facility] [address] [place] [address] [id number] [place] [address]  Pharmacy Notified: Yes  Patient Notified: Yes **Instructed pharmacy to notify patient when script is ready to /ship.**

## 2023-06-28 NOTE — TELEPHONE ENCOUNTER
PA Initiation    Medication: AJOVY 225 MG/1.5ML SC SOAJ  Insurance Company: [employer] ([facility]) - Phone [phone] Fax [phone]  Pharmacy Filling the Rx: [facility] [address] [place] [id number] [address] [facility]  Filling Pharmacy Phone: [phone]  Filling Pharmacy Fax: [phone]  Start Date: 6/28/2023

## 2023-07-04 NOTE — TELEPHONE ENCOUNTER
Physical Therapy      Patient Name:  Hamzah Nicolas   MRN:  [id number]    07:45 A.M.  Patient eating breakfast at this time and receiving nursing care/meds.   Will follow up at next available opportunity.      Pt has upcoming appt scheduled for 7/15/2019

## 2023-07-11 ENCOUNTER — OFFICE VISIT (OUTPATIENT)
Dept: FAMILY MEDICINE | Facility: CLINIC | Age: 23
End: 2023-07-11
Payer: COMMERCIAL

## 2023-07-11 VITALS
RESPIRATION RATE: 16 BRPM | BODY MASS INDEX: 48.04 KG/M2 | DIASTOLIC BLOOD PRESSURE: 89 MMHG | TEMPERATURE: 98.2 F | WEIGHT: 281.4 LBS | HEART RATE: 83 BPM | SYSTOLIC BLOOD PRESSURE: 127 MMHG | OXYGEN SATURATION: 97 % | HEIGHT: 64 IN

## 2023-07-11 DIAGNOSIS — F33.2 SEVERE EPISODE OF RECURRENT MAJOR DEPRESSIVE DISORDER, WITHOUT PSYCHOTIC FEATURES (H): Primary | ICD-10-CM

## 2023-07-11 DIAGNOSIS — F41.1 GAD (GENERALIZED ANXIETY DISORDER): ICD-10-CM

## 2023-07-11 DIAGNOSIS — F51.02 ADJUSTMENT INSOMNIA: ICD-10-CM

## 2023-07-11 PROCEDURE — 99214 OFFICE O/P EST MOD 30 MIN: CPT | Performed by: NURSE PRACTITIONER

## 2023-07-11 RX ORDER — VENLAFAXINE HYDROCHLORIDE 150 MG/1
150 CAPSULE, EXTENDED RELEASE ORAL DAILY
Qty: 90 CAPSULE | Refills: 1 | Status: SHIPPED | OUTPATIENT
Start: 2023-07-11 | End: 2024-03-08

## 2023-07-11 ASSESSMENT — PATIENT HEALTH QUESTIONNAIRE - PHQ9
10. IF YOU CHECKED OFF ANY PROBLEMS, HOW DIFFICULT HAVE THESE PROBLEMS MADE IT FOR YOU TO DO YOUR WORK, TAKE CARE OF THINGS AT HOME, OR GET ALONG WITH OTHER PEOPLE: SOMEWHAT DIFFICULT
SUM OF ALL RESPONSES TO PHQ QUESTIONS 1-9: 24
SUM OF ALL RESPONSES TO PHQ QUESTIONS 1-9: 24

## 2023-07-11 ASSESSMENT — ANXIETY QUESTIONNAIRES
2. NOT BEING ABLE TO STOP OR CONTROL WORRYING: NEARLY EVERY DAY
IF YOU CHECKED OFF ANY PROBLEMS ON THIS QUESTIONNAIRE, HOW DIFFICULT HAVE THESE PROBLEMS MADE IT FOR YOU TO DO YOUR WORK, TAKE CARE OF THINGS AT HOME, OR GET ALONG WITH OTHER PEOPLE: EXTREMELY DIFFICULT
6. BECOMING EASILY ANNOYED OR IRRITABLE: NEARLY EVERY DAY
7. FEELING AFRAID AS IF SOMETHING AWFUL MIGHT HAPPEN: NEARLY EVERY DAY
4. TROUBLE RELAXING: MORE THAN HALF THE DAYS
GAD7 TOTAL SCORE: 19
GAD7 TOTAL SCORE: 19
5. BEING SO RESTLESS THAT IT IS HARD TO SIT STILL: MORE THAN HALF THE DAYS
1. FEELING NERVOUS, ANXIOUS, OR ON EDGE: NEARLY EVERY DAY
3. WORRYING TOO MUCH ABOUT DIFFERENT THINGS: NEARLY EVERY DAY

## 2023-07-11 NOTE — PROGRESS NOTES
"  Assessment & Plan     Severe episode of recurrent major depressive disorder, without psychotic features (H)  MORTEZA (generalized anxiety disorder)  Uncontrolled due to new recent life stressors with new employer, is temporary.   Working on sleep regulation with night shift. Using melatonin as needed.   Discussed and agreed upon venlafaxine dose increase to 150 mg/day.   Follow-up 4-6 weeks; sooner as needed.   - venlafaxine (EFFEXOR XR) 150 MG 24 hr capsule; Take 1 capsule (150 mg) by mouth daily    Adjustment insomnia  Over the counter medication as needed as above.   Will follow-up if not beneficial.               BMI:   Estimated body mass index is 48.3 kg/m  as calculated from the following:    Height as of this encounter: 1.626 m (5' 4\").    Weight as of this encounter: 127.6 kg (281 lb 6.4 oz).           RAYNA Jovel [facility]    Samuel Park is a 22 year old, presenting for the following health issues:  Recheck Medication (Mood issues depression getting worst meds are not helping)        7/11/2023     8:21 AM   Additional Questions   Roomed by pamela gallardo   Accompanied by self     History of Present Illness       Mental Health Follow-up:  Patient presents to follow-up on Depression & Anxiety.Patient's depression since last visit has been:  Worse  The patient is having other symptoms associated with depression.  Patient's anxiety since last visit has been:  Worse  The patient is having other symptoms associated with anxiety.  Any significant life events: job concerns  Patient is feeling anxious or having panic attacks.  Patient has no concerns about alcohol or drug use.    She eats 0-1 servings of fruits and vegetables daily.She consumes 1 sweetened beverage(s) daily.She exercises with enough effort to increase her heart rate 30 to 60 minutes per day.  She exercises with enough effort to increase her heart rate 6 days per week.   She is taking medications " "regularly.    Today's PHQ-9         PHQ-9 Total Score: 24    PHQ-9 Q9 Thoughts of better off dead/self-harm past 2 weeks :   Not at all    How difficult have these problems made it for you to do your work, take care of things at home, or get along with other people: Somewhat difficult  Today's MORTEZA-7 Score: 19       Wants to check her medication so she can fell better about the depression  Has had some stressor changes with work change.   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 Using over the counter melatonin drink that is helpful.   Denies SI.         12/27/2022     8:08 AM 2/14/2023     2:42 PM 7/11/2023     8:08 AM   PHQ   PHQ-9 Total Score 21 6 24   Q9: Thoughts of better off dead/self-harm past 2 weeks Several days Not at all Not at all   F/U: Thoughts of suicide or self-harm No     F/U: Safety concerns No           12/27/2022     8:09 AM 2/14/2023     2:43 PM 7/11/2023     8:09 AM   MORTEZA-7 SCORE   Total Score 21 (severe anxiety) 6 (mild anxiety) 19 (severe anxiety)   Total Score 21 6 19         Review of Systems   Constitutional, HEENT, cardiovascular, pulmonary, gi and gu systems are negative, except as otherwise noted.      Objective    /89 (BP Location: Right arm, Patient Position: Sitting, Cuff Size: Adult Large)   Pulse 83   Temp 98.2  F (36.8  C) (Oral)   Resp 16   Ht 1.626 m (5' 4\")   Wt 127.6 kg (281 lb 6.4 oz)   SpO2 97%   BMI 48.30 kg/m    Body mass index is 48.3 kg/m .  Physical Exam   GENERAL: healthy, alert and no distress  RESP: regular rate and effort  CV: regular rate and rhythm, normal S1 S2, no S3 or S4, no murmur, click or rub, no peripheral edema and peripheral pulses strong  PSYCH: mentation appears normal, affect normal/bright                    "

## 2023-07-23 ENCOUNTER — HEALTH MAINTENANCE LETTER (OUTPATIENT)
Age: 23
End: 2023-07-23

## 2023-09-01 ENCOUNTER — OFFICE VISIT (OUTPATIENT)
Dept: FAMILY MEDICINE | Facility: CLINIC | Age: 23
End: 2023-09-01
Payer: COMMERCIAL

## 2023-09-01 VITALS
HEIGHT: 64 IN | SYSTOLIC BLOOD PRESSURE: 116 MMHG | OXYGEN SATURATION: 100 % | WEIGHT: 281.3 LBS | HEART RATE: 93 BPM | BODY MASS INDEX: 48.03 KG/M2 | TEMPERATURE: 97.5 F | DIASTOLIC BLOOD PRESSURE: 78 MMHG | RESPIRATION RATE: 20 BRPM

## 2023-09-01 DIAGNOSIS — F41.1 GAD (GENERALIZED ANXIETY DISORDER): ICD-10-CM

## 2023-09-01 DIAGNOSIS — F33.2 SEVERE EPISODE OF RECURRENT MAJOR DEPRESSIVE DISORDER, WITHOUT PSYCHOTIC FEATURES (H): Primary | ICD-10-CM

## 2023-09-01 PROCEDURE — 99213 OFFICE O/P EST LOW 20 MIN: CPT | Mod: 25 | Performed by: NURSE PRACTITIONER

## 2023-09-01 PROCEDURE — 90471 IMMUNIZATION ADMIN: CPT | Performed by: NURSE PRACTITIONER

## 2023-09-01 PROCEDURE — 90715 TDAP VACCINE 7 YRS/> IM: CPT | Performed by: NURSE PRACTITIONER

## 2023-09-01 ASSESSMENT — ANXIETY QUESTIONNAIRES
2. NOT BEING ABLE TO STOP OR CONTROL WORRYING: SEVERAL DAYS
1. FEELING NERVOUS, ANXIOUS, OR ON EDGE: SEVERAL DAYS
4. TROUBLE RELAXING: MORE THAN HALF THE DAYS
IF YOU CHECKED OFF ANY PROBLEMS ON THIS QUESTIONNAIRE, HOW DIFFICULT HAVE THESE PROBLEMS MADE IT FOR YOU TO DO YOUR WORK, TAKE CARE OF THINGS AT HOME, OR GET ALONG WITH OTHER PEOPLE: SOMEWHAT DIFFICULT
7. FEELING AFRAID AS IF SOMETHING AWFUL MIGHT HAPPEN: SEVERAL DAYS
5. BEING SO RESTLESS THAT IT IS HARD TO SIT STILL: SEVERAL DAYS
3. WORRYING TOO MUCH ABOUT DIFFERENT THINGS: MORE THAN HALF THE DAYS
6. BECOMING EASILY ANNOYED OR IRRITABLE: MORE THAN HALF THE DAYS
GAD7 TOTAL SCORE: 10
8. IF YOU CHECKED OFF ANY PROBLEMS, HOW DIFFICULT HAVE THESE MADE IT FOR YOU TO DO YOUR WORK, TAKE CARE OF THINGS AT HOME, OR GET ALONG WITH OTHER PEOPLE?: SOMEWHAT DIFFICULT

## 2023-09-01 ASSESSMENT — PAIN SCALES - GENERAL: PAINLEVEL: MODERATE PAIN (4)

## 2023-09-01 ASSESSMENT — PATIENT HEALTH QUESTIONNAIRE - PHQ9: SUM OF ALL RESPONSES TO PHQ QUESTIONS 1-9: 14

## 2023-09-01 NOTE — PROGRESS NOTES
"  Assessment & Plan     Severe episode of recurrent major depressive disorder, without psychotic features (H)  MORTEZA (generalized anxiety disorder)  Improved control.   Will continue with current venlafxine 150 mg/day.   Recommended counseling, patient declines referral today.                BMI:   Estimated body mass index is 48.29 kg/m  as calculated from the following:    Height as of this encounter: 1.626 m (5' 4\").    Weight as of this encounter: 127.6 kg (281 lb 4.8 oz).           RAYNA Jovel [facility]    Samuel Park is a 22 year old, presenting for the following health issues:  Depression and Follow Up        9/1/2023     8:19 AM   Additional Questions   Roomed by Any Carney       History of Present Illness       Mental Health Follow-up:  Patient presents to follow-up on Depression & Anxiety.Patient's depression since last visit has been:  Better  The patient is not having other symptoms associated with depression.  Patient's anxiety since last visit has been:  Medium  The patient is not having other symptoms associated with anxiety.  Any significant life events: No  Patient is feeling anxious or having panic attacks.  Patient has no concerns about alcohol or drug use.    She eats 0-1 servings of fruits and vegetables daily.She consumes 1 sweetened beverage(s) daily.She exercises with enough effort to increase her heart rate 20 to 29 minutes per day.  She exercises with enough effort to increase her heart rate 3 or less days per week.   She is taking medications regularly.     Overall, feels better.   Stressor with one lead, some good days and some hard days.   Enjoys job outside of that lead.   Will be talking with supervisor.         2/14/2023     2:42 PM 7/11/2023     8:08 AM 9/1/2023     8:33 AM   PHQ   PHQ-9 Total Score 6 24 14   Q9: Thoughts of better off dead/self-harm past 2 weeks Not at all Not at all Not at all         2/14/2023     2:43 PM " "7/11/2023     8:09 AM 9/1/2023     8:17 AM   MORTEZA-7 SCORE   Total Score 6 (mild anxiety) 19 (severe anxiety) 10 (moderate anxiety)   Total Score 6 19 10           Review of Systems   Constitutional, HEENT, cardiovascular, pulmonary, gi and gu systems are negative, except as otherwise noted.      Objective    /78 (BP Location: Right arm, Patient Position: Sitting, Cuff Size: Adult Large)   Pulse 93   Temp 97.5  F (36.4  C) (Oral)   Resp 20   Ht 1.626 m (5' 4\")   Wt 127.6 kg (281 lb 4.8 oz)   SpO2 100%   BMI 48.29 kg/m    Body mass index is 48.29 kg/m .  Physical Exam                         "

## 2023-09-15 ENCOUNTER — ALLIED HEALTH/NURSE VISIT (OUTPATIENT)
Dept: FAMILY MEDICINE | Facility: CLINIC | Age: 23
End: 2023-09-15
Payer: COMMERCIAL

## 2023-09-15 DIAGNOSIS — Z30.42 ENCOUNTER FOR SURVEILLANCE OF INJECTABLE CONTRACEPTIVE: Primary | ICD-10-CM

## 2023-09-15 PROCEDURE — 99207 PR NO CHARGE NURSE ONLY: CPT

## 2023-09-15 PROCEDURE — 96372 THER/PROPH/DIAG INJ SC/IM: CPT | Performed by: NURSE PRACTITIONER

## 2023-09-15 RX ADMIN — MEDROXYPROGESTERONE ACETATE 150 MG: 150 INJECTION, SUSPENSION INTRAMUSCULAR at 08:23

## 2023-09-15 NOTE — PROGRESS NOTES
Clinic Administered Medication Documentation      Depo Provera Documentation    Depo-Provera Standing Order inclusion/exclusion criteria reviewed.     Is this the initial or subsequent dose of Depo Provera? Subsequent dose - patient is within the acceptable window of time (11-15 weeks) for subsequent injection. Pregnancy test not indicated.    Patient meets: inclusion criteria     Is there an active order (written within the past 365 days, with administrations remaining, not ) in the chart? Yes.     Prior to injection, verified patient identity using patient's name and date of birth. Medication was administered. Please see MAR and medication order for additional information.     Vial/Syringe: Single dose vial. Was entire vial of medication used? Yes    Patient instructed to remain in clinic for 15 minutes and report any adverse reaction to staff immediately.  NEXT INJECTION DUE: 23 - 23    Verified that the patient has refills remaining in their prescription.

## 2023-09-26 ENCOUNTER — TELEPHONE (OUTPATIENT)
Dept: NEUROLOGY | Facility: CLINIC | Age: 23
End: 2023-09-26
Payer: COMMERCIAL

## 2023-09-26 ENCOUNTER — MYC MEDICAL ADVICE (OUTPATIENT)
Dept: FAMILY MEDICINE | Facility: CLINIC | Age: 23
End: 2023-09-26
Payer: COMMERCIAL

## 2023-09-26 DIAGNOSIS — G43.719 INTRACTABLE CHRONIC MIGRAINE WITHOUT AURA AND WITHOUT STATUS MIGRAINOSUS: ICD-10-CM

## 2023-09-26 NOTE — TELEPHONE ENCOUNTER
Rx Authorization:  Requested Medication/ Dose rizatriptan (MAXALT-MLT) 5 MG ODT   Date last refill ordered: 7/11/22  Quantity ordered: 18 Tablets  # refills: 0  Date of last clinic visit with ordering provider: 10/4/22  Date of next clinic visit with ordering provider:   All pertinent protocol data (lab date/result):   Include pertinent information from patients message:      Spray Paint Text: The liquid nitrogen was applied to the skin utilizing a spray paint frosting technique. Render Note In Bullet Format When Appropriate: No Medical Necessity Clause: This procedure was medically necessary because the lesions that were treated were: Show Applicator Variable?: Yes Detail Level: Detailed Post-Care Instructions: I reviewed with the patient in detail post-care instructions. Patient is to wear sunprotection, and avoid picking at any of the treated lesions. Pt may apply Vaseline to crusted or scabbing areas. Medical Necessity Information: It is in your best interest to select a reason for this procedure from the list below. All of these items fulfill various CMS LCD requirements except the new and changing color options. Consent: The patient's consent was obtained including but not limited to risks of crusting, scabbing, blistering, scarring, darker or lighter pigmentary change, recurrence, incomplete removal and infection.

## 2023-09-26 NOTE — TELEPHONE ENCOUNTER
Please schedule pt with Leigh for a virtual appt on Thursday October 26 at 9:30am. Pt is aware.  Once scheduled, please remove hold. Thank you.

## 2023-09-27 RX ORDER — RIZATRIPTAN BENZOATE 5 MG/1
TABLET, ORALLY DISINTEGRATING ORAL
Qty: 18 TABLET | Refills: 6 | Status: SHIPPED | OUTPATIENT
Start: 2023-09-27

## 2023-10-23 ASSESSMENT — HEADACHE IMPACT TEST (HIT 6)
HOW OFTEN HAVE YOU FELT TOO TIRED TO WORK BECAUSE OF YOUR HEADACHES: RARELY
HIT6 TOTAL SCORE: 66
WHEN YOU HAVE A HEADACHE HOW OFTEN DO YOU WISH YOU COULD LIE DOWN: ALWAYS
HOW OFTEN DO HEADACHES LIMIT YOUR DAILY ACTIVITIES: SOMETIMES
HOW OFTEN HAVE YOU FELT FED UP OR IRRITATED BECAUSE OF YOUR HEADACHES: ALWAYS
HOW OFTEN DID HEADACHS LIMIT CONCENTRATION ON WORK OR DAILY ACTIVITY: VERY OFTEN
WHEN YOU HAVE HEADACHES HOW OFTEN IS THE PAIN SEVERE: VERY OFTEN

## 2023-10-26 ENCOUNTER — VIRTUAL VISIT (OUTPATIENT)
Dept: NEUROLOGY | Facility: CLINIC | Age: 23
End: 2023-10-26
Payer: COMMERCIAL

## 2023-10-26 VITALS — WEIGHT: 270 LBS | BODY MASS INDEX: 46.35 KG/M2

## 2023-10-26 DIAGNOSIS — G43.719 INTRACTABLE CHRONIC MIGRAINE WITHOUT AURA AND WITHOUT STATUS MIGRAINOSUS: Primary | ICD-10-CM

## 2023-10-26 PROCEDURE — 99214 OFFICE O/P EST MOD 30 MIN: CPT | Mod: VID | Performed by: NURSE PRACTITIONER

## 2023-10-26 ASSESSMENT — PAIN SCALES - GENERAL: PAINLEVEL: SEVERE PAIN (6)

## 2023-10-26 NOTE — NURSING NOTE
Is the patient currently in the state of [place]? YES    Visit mode:VIDEO    If the visit is dropped, the patient can be reconnected by: VIDEO VISIT: Text to cell phone:   Telephone Information:   Mobile [phone]       Will anyone else be joining the visit? NO  (If patient encounters technical issues they should call [phone]    How would you like to obtain your AVS? MyChart    Are changes needed to the allergy or medication list? No, Pt stated no changes to allergies, and Pt stated no med changes    Reason for visit: SAMANTA PIMENTEL

## 2023-10-26 NOTE — PATIENT INSTRUCTIONS
Plan:  Recommended to optimize migraine prevention.   A trial of Botox along with Ajovy for migraine/headache prevention   Rescue treatment -rizatriptan and naproxen   Follow up -after 2 rounds (about 6 months after starting Botox treatment if approved or sooner if needed

## 2023-10-26 NOTE — PROGRESS NOTES
Vivian is a 23 year old who is being evaluated via a billable video visit.      Subjective   Vivian is a 23 year old, presenting for the following health issues: Headache follow-up  RECHECK  Initial headache clinic visit on 6/30/2021, see note for details.  Headaches since 6 grade.  Family history of headaches mother and both sisters suffer of chronic migraine headaches.  Headaches are in the forehead and pounding and throbbing.  Associated with photo phobia and phonophobia, nausea and vomiting.  No visual aura reported.  Trigger it or worse with fluorescent lights and noises.  Patient was started on Ajovy at her initial visit and was to continue topiramate for headache prevention.  For rescue she was started rizatriptan and naproxen as needed.  Patient was seen in follow-up on 10/4/2022 and has been taking Ajovy and topiramate for headache prevention without any a.m. side effects concerns.  Patient felt that the combination of treatment worked very well.  Patient sent [employer] message 9/26/2023 reporting a feeling of dizziness and lightheadedness after taking topiramate.  She eliminated caffeine but still felt dizzy and lightheaded.  Patient stopped topiramate and felt improvement/resolution of her symptoms.   Today reports that she stopped topiramate due to side effects-dizziness/light headeness and symptoms improved after stopping topiramate.   Has been on topiramate for 4 years and tolerated but starts causing side effects in the last 2 months.  Migraine got worse -more frequent and severe since stopping topiramate.  No new changes in headache symptoms.   Total headache days -between 20-25/month with sever more than 8/month since stopping topiramate.   Has been taking Ajovy and does not think Ajovy works by itself. Was working well with topiramate -had only 3 headaches per month. Had frequent headache when was on topiramate only before starting Ajovy.   Rescue Tx-rizatriptan and naproxen -both help as needed  and no side effects reported    Headache treatment -  Ajovy currently on since 2021  Rizatriptan and naproxen as needed and both help -current  topiramate 100 mg BID for 4 years but start causing side effects and stopped in September  Amitriptyline 150 mg caused weight gain  Tried propranolol -caused side effects and did not help  Sertraline -  Sumatriptan as needed but takes it almost every day  Tried mom's nurtec -helps a little bit   On Prestiq for depression in the past and changed to venlafaxine in the spring of 2023  Depression is stable. Has been managed by PCP.    Has established mental health care for depression. Got better.     Impression:   chronic migraine    Plan:  Recommended to optimize migraine prevention.  Discussed with the patient options.   Recommended A trial of Botox along with Ajovy for migraine/headache prevention   Rescue treatment -rizatriptan and naproxen   If no improvement in headaches or worsening would suggest to get an updated eye exam and brain MRI for any structural abnormalities to explain worsening of patient's headaches  Follow up -after 2 rounds of Botox if approved         Objective    Vitals - Patient Reported  Pain Score: Severe Pain (6)  Pain Loc: Head      Physical Exam   Patient is alert and no in apparent acute distress,  mentation appears normal, judgement and insight intact, normal speech, no weakness observed    I discussed all my recommendations with Vivian Carvalho who verbalizes understanding and comfortable with the plan.      31 minutes spent on the date of the encounter doing video access, chart  review, meds review, treatment plan, documentation and further activities as noted above    RAYNA Leary, CNP [facility]  Headache certified  [facility]        Video-Visit Details    Type of service:  Video Visit   Originating Location (pt. Location): Home  Distant Location (provider location):  Off-site  Platform used for Video Visit: [employer]

## 2023-10-26 NOTE — LETTER
10/26/2023       RE: Vivian Carvalho  [address] [facility] [address]     Dear Colleague,    Thank you for referring your patient, Vivian Carvalho, to the [facility] NEUROLOGY CLINIC [place] at [facility]. Please see a copy of my visit note below.    Vivian is a 23 year old who is being evaluated via a billable video visit.      Subjective  Vivian is a 23 year old, presenting for the following health issues: Headache follow-up  RECHECK  Initial headache clinic visit on 6/30/2021, see note for details.  Headaches since 6 grade.  Family history of headaches mother and both sisters suffer of chronic migraine headaches.  Headaches are in the forehead and pounding and throbbing.  Associated with photo phobia and phonophobia, nausea and vomiting.  No visual aura reported.  Trigger it or worse with fluorescent lights and noises.  Patient was started on Ajovy at her initial visit and was to continue topiramate for headache prevention.  For rescue she was started rizatriptan and naproxen as needed.  Patient was seen in follow-up on 10/4/2022 and has been taking Ajovy and topiramate for headache prevention without any a.m. side effects concerns.  Patient felt that the combination of treatment worked very well.  Patient sent [employer] message 9/26/2023 reporting a feeling of dizziness and lightheadedness after taking topiramate.  She eliminated caffeine but still felt dizzy and lightheaded.  Patient stopped topiramate and felt improvement/resolution of her symptoms.   Today reports that she stopped topiramate due to side effects-dizziness/light headeness and symptoms improved after stopping topiramate.   Has been on topiramate for 4 years and tolerated but starts causing side effects in the last 2 months.  Migraine got worse -more frequent and severe since stopping topiramate.  No new changes in headache symptoms.   Total headache days -between  20-25/month with sever more than 8/month since stopping topiramate.   Has been taking Ajovy and does not think Ajovy works by itself. Was working well with topiramate -had only 3 headaches per month. Had frequent headache when was on topiramate only before starting Ajovy.   Rescue Tx-rizatriptan and naproxen -both help as needed and no side effects reported    Headache treatment -  Ajovy currently on since 2021  Rizatriptan and naproxen as needed and both help -current  topiramate 100 mg BID for 4 years but start causing side effects and stopped in September  Amitriptyline 150 mg caused weight gain  Tried propranolol -caused side effects and did not help  Sertraline -  Sumatriptan as needed but takes it almost every day  Tried mom's nurtec -helps a little bit   On Prestiq for depression in the past and changed to venlafaxine in the spring of 2023  Depression is stable. Has been managed by PCP.    Has established mental health care for depression. Got better.     Impression:   chronic migraine    Plan:  Recommended to optimize migraine prevention.  Discussed with the patient options.   Recommended A trial of Botox along with Ajovy for migraine/headache prevention   Rescue treatment -rizatriptan and naproxen   If no improvement in headaches or worsening would suggest to get an updated eye exam and brain MRI for any structural abnormalities to explain worsening of patient's headaches  Follow up -after 2 rounds of Botox if approved         Objective   Vitals - Patient Reported  Pain Score: Severe Pain (6)  Pain Loc: Head      Physical Exam   Patient is alert and no in apparent acute distress,  mentation appears normal, judgement and insight intact, normal speech, no weakness observed    I discussed all my recommendations with Vivian Carvalho who verbalizes understanding and comfortable with the plan.      31 minutes spent on the date of the encounter doing video access, chart  review, meds review, treatment plan,  documentation and further activities as noted above      Again, thank you for allowing me to participate in the care of your patient.      Sincerely,    RAYNA Rios CNP

## 2023-11-01 ENCOUNTER — TELEPHONE (OUTPATIENT)
Dept: NEUROLOGY | Facility: CLINIC | Age: 23
End: 2023-11-01
Payer: COMMERCIAL

## 2023-11-01 NOTE — TELEPHONE ENCOUNTER
2/10/2017              [facility]         To Whom It May Concern,    Please be advised Alexandra Ramos is under my care. She has been diagnosed with Postural Orthostatic Tachycardia Syndrome.  Due to this derek Left Voicemail (1st Attempt) and Sent Mychart (1st Attempt) for the patient to call back and schedule the following:    Appointment type: Return Headache  Provider: RAYNA Leary CNP  Return date: April 2024  Specialty phone number: [phone]  Additional appointment(s) needed: N/A  Additional Notes: N/A    [place] on 11/1/2023 at 4:38 PM

## 2023-11-13 ENCOUNTER — OFFICE VISIT (OUTPATIENT)
Dept: NEUROLOGY | Facility: CLINIC | Age: 23
End: 2023-11-13
Payer: COMMERCIAL

## 2023-11-13 DIAGNOSIS — G43.719 INTRACTABLE CHRONIC MIGRAINE WITHOUT AURA AND WITHOUT STATUS MIGRAINOSUS: Primary | ICD-10-CM

## 2023-11-13 PROCEDURE — 64615 CHEMODENERV MUSC MIGRAINE: CPT | Performed by: NURSE PRACTITIONER

## 2023-11-13 NOTE — PROGRESS NOTES
PROCEDURE NOTE:    [facility]  Botulinum Toxin Procedure    Headache Neurology    November 13, 2023    Procedure:  OnabotulinumtoxinA injections for chronic migraine  Indication:  Chronic migraine    Patient suffers from severe intractable headaches.  She was referred for onabotulinumtoxinA injections for headache.  Risks, benefits, and alternatives were discussed.  All questions were answered and consent given.  She decided to proceed with the injections.     Prior to initiation of botulinum toxin injections, Ms. Carvalho reported 20-25 headache days per month, with 8 severe headache days per month. Her headaches are quite disabling and often interfere with her ability to function normally.    Date of last injections: initial botox today-11/13/2023    Ms. Carvalho reports 20-25 headache days per month currently, with 8 severe headache days per month.  She has noticed a wearing off phenomenon prior to this round of botulinum toxin injections, lasting 0 weeks.    Botulinum toxin injections have improved their functioning. Initial round today    She has attempted other migraine prophylactic treatments in the past, which have included:   Headache treatment -  Ajovy currently on since 2021  Rizatriptan and naproxen as needed and both help -current  topiramate 100 mg BID for 4 years but start causing side effects and stopped in September  Amitriptyline 150 mg caused weight gain  Tried propranolol -caused side effects and did not help  Sertraline -  Sumatriptan as needed but takes it almost every day  Tried mom's nurtec -helps a little bit   On Prestiq for depression in the past and changed to venlafaxine in the spring of 2023  Depression is stable. Has been managed by PCP.    Has established mental health care for depression. Got better.        Ms. Canos pain was assessed prior to the procedure.  She rated her pain today as 6 out of 10.    Procedural Pause: Procedural pause was conducted to verify correct patient  identity, procedure to be performed, correct side and site, correct patient position, and special requirements. Appropriate hand hygiene was utilized, and each injection site was prepped with alcohol wipe or Chloraprep swab.     Procedure Details: 200 units of onabotulinumtoxinA was diluted in 4 mL 0.9% normal saline. A total of 155 units of onabotulinumtoxinA were injected using 30 gauge 0.5 in needles into the muscles listed below. 45 units of onabotulinumtoxinA were wasted.       GOAL OF PROCEDURE:  The goal of this procedure is to decrease pain and enhance functional independence.    CONSENT:  The risks, benefits, and treatment options were discussed with the patient who agreed to proceed.    Written consent was obtained     EQUIPMENT USED:  Needles-30 gauge, 0.5 inches for injections  Four 1-ml tuberculin syringes for injections  One sodium chloride 10 ml vial preservative free  Alcohol swabs    SKIN PREPARATION:  Skin preparation was performed using an alcohol wipe.      AREA/MUSCLE INJECTED:  155 units of Botox  Right upper Trapezius (upper cervical) - 5 units of Botox at 3 site/s.   Left upper Trapezius (upper cervical) - 5 units of Botox at 3 site/s.     Right cervical paraspinals - 5 units of Botox at 2 site/s.   Left cervical paraspinals - 5 units of Botox at 2 site/s.     Left occipitalis - 5 units of Botox at 3 site/s.  Right occipitalis -5 units of Botox at 3 site/s    Right Frontalis - 5 units of Botox at 2 site/s.  Left Frontalis - 5 units of Botox at 2 site/s.    Right Temporalis - 5 units of Botox at 4 site/s.  Left Temporalis - 5 units of Botox at 4 site/s.    Right  - 5 units of Botox at 1 site/s.              Left  - 5 units of Botox at 1 site/s.    Procerus - 5 units of Botox at 1 site/s.    RESPONSE TO PROCEDURE:  tolerated the procedure well and there were no immediate complications.  Patient was allowed to recover for an appropriate period of time and was discharged home  in stable condition.    FOLLOW UP:    Repeat Botox injections in 12 weeks      This procedure was performed under a hospital privileging agreement with RAYNA Huertas, CNP  [facility]

## 2023-11-13 NOTE — LETTER
11/13/2023       RE: Vivian Carvalho  [address] [facility] [id number]       Dear Colleague,    Thank you for referring your patient, Vivian Carvalho, to the [facility] NEUROLOGY CLINIC [place] at [facility]. Please see a copy of my visit note below.    PROCEDURE NOTE:    [facility]  Botulinum Toxin Procedure    Headache Neurology    November 13, 2023    Procedure:  OnabotulinumtoxinA injections for chronic migraine  Indication:  Chronic migraine    Patient suffers from severe intractable headaches.  She was referred for onabotulinumtoxinA injections for headache.  Risks, benefits, and alternatives were discussed.  All questions were answered and consent given.  She decided to proceed with the injections.     Prior to initiation of botulinum toxin injections, Ms. Carvalho reported 20-25 headache days per month, with 8 severe headache days per month. Her headaches are quite disabling and often interfere with her ability to function normally.    Date of last injections: initial botox today-11/13/2023    Ms. Carvalho reports 20-25 headache days per month currently, with 8 severe headache days per month.  She has noticed a wearing off phenomenon prior to this round of botulinum toxin injections, lasting 0 weeks.    Botulinum toxin injections have improved their functioning. Initial round today    She has attempted other migraine prophylactic treatments in the past, which have included:   Headache treatment -  Ajovy currently on since 2021  Rizatriptan and naproxen as needed and both help -current  topiramate 100 mg BID for 4 years but start causing side effects and stopped in September  Amitriptyline 150 mg caused weight gain  Tried propranolol -caused side effects and did not help  Sertraline -  Sumatriptan as needed but takes it almost every day  Tried mom's nurtec -helps a little bit   On Prestiq for depression in the past and changed to  venlafaxine in the spring of 2023  Depression is stable. Has been managed by PCP.    Has established mental health care for depression. Got better.        Ms. Carvalho's pain was assessed prior to the procedure.  She rated her pain today as 6 out of 10.    Procedural Pause: Procedural pause was conducted to verify correct patient identity, procedure to be performed, correct side and site, correct patient position, and special requirements. Appropriate hand hygiene was utilized, and each injection site was prepped with alcohol wipe or Chloraprep swab.     Procedure Details: 200 units of onabotulinumtoxinA was diluted in 4 mL 0.9% normal saline. A total of 155 units of onabotulinumtoxinA were injected using 30 gauge 0.5 in needles into the muscles listed below. 45 units of onabotulinumtoxinA were wasted.       GOAL OF PROCEDURE:  The goal of this procedure is to decrease pain and enhance functional independence.    CONSENT:  The risks, benefits, and treatment options were discussed with the patient who agreed to proceed.    Written consent was obtained     EQUIPMENT USED:  Needles-30 gauge, 0.5 inches for injections  Four 1-ml tuberculin syringes for injections  One sodium chloride 10 ml vial preservative free  Alcohol swabs    SKIN PREPARATION:  Skin preparation was performed using an alcohol wipe.      AREA/MUSCLE INJECTED:  155 units of Botox  Right upper Trapezius (upper cervical) - 5 units of Botox at 3 site/s.   Left upper Trapezius (upper cervical) - 5 units of Botox at 3 site/s.     Right cervical paraspinals - 5 units of Botox at 2 site/s.   Left cervical paraspinals - 5 units of Botox at 2 site/s.     Left occipitalis - 5 units of Botox at 3 site/s.  Right occipitalis -5 units of Botox at 3 site/s    Right Frontalis - 5 units of Botox at 2 site/s.  Left Frontalis - 5 units of Botox at 2 site/s.    Right Temporalis - 5 units of Botox at 4 site/s.  Left Temporalis - 5 units of Botox at 4 site/s.    Right   - 5 units of Botox at 1 site/s.              Left  - 5 units of Botox at 1 site/s.    Procerus - 5 units of Botox at 1 site/s.    RESPONSE TO PROCEDURE:  tolerated the procedure well and there were no immediate complications.  Patient was allowed to recover for an appropriate period of time and was discharged home in stable condition.    FOLLOW UP:    Repeat Botox injections in 12 weeks      This procedure was performed under a hospital privileging agreement with Dr. Norwood        Again, thank you for allowing me to participate in the care of your patient.      Sincerely,    RAYNA Rios CNP

## 2023-11-27 DIAGNOSIS — G43.719 INTRACTABLE CHRONIC MIGRAINE WITHOUT AURA AND WITHOUT STATUS MIGRAINOSUS: ICD-10-CM

## 2023-11-27 RX ORDER — FREMANEZUMAB-VFRM 225 MG/1.5ML
225 INJECTION SUBCUTANEOUS
Qty: 1.5 ML | Refills: 11 | Status: SHIPPED | OUTPATIENT
Start: 2023-11-27

## 2023-12-15 ENCOUNTER — ALLIED HEALTH/NURSE VISIT (OUTPATIENT)
Dept: FAMILY MEDICINE | Facility: CLINIC | Age: 23
End: 2023-12-15
Payer: COMMERCIAL

## 2023-12-15 DIAGNOSIS — Z30.42 DEPO-PROVERA CONTRACEPTIVE STATUS: Primary | ICD-10-CM

## 2023-12-15 PROCEDURE — 96372 THER/PROPH/DIAG INJ SC/IM: CPT | Performed by: NURSE PRACTITIONER

## 2023-12-15 PROCEDURE — 99207 PR NO CHARGE NURSE ONLY: CPT

## 2023-12-15 RX ADMIN — MEDROXYPROGESTERONE ACETATE 150 MG: 150 INJECTION, SUSPENSION INTRAMUSCULAR at 09:08

## 2023-12-15 NOTE — PROGRESS NOTES
Clinic Administered Medication Documentation      Depo Provera Documentation    Depo-Provera Standing Order inclusion/exclusion criteria reviewed.     Is this the initial or subsequent dose of Depo Provera? Subsequent dose - patient is within the acceptable window of time (11-15 weeks) for subsequent injection. Pregnancy test not indicated.    Patient meets: inclusion criteria     Is there an active order (written within the past 365 days, with administrations remaining, not ) in the chart? Yes.     Prior to injection, verified patient identity using patient's name and date of birth. Medication was administered. Please see MAR and medication order for additional information.     Vial/Syringe: Single dose vial. Was entire vial of medication used? Yes    Patient instructed to remain in clinic for 15 minutes and report any adverse reaction to staff immediately.  NEXT INJECTION DUE: 3/1/24 - 3/29/24    Patient has no refills remaining. Refill encounter opened, order pended and Routed to the provider    Manisha Bhatti MA

## 2024-01-22 DIAGNOSIS — G43.719 INTRACTABLE CHRONIC MIGRAINE WITHOUT AURA AND WITHOUT STATUS MIGRAINOSUS: ICD-10-CM

## 2024-01-29 DIAGNOSIS — G43.719 INTRACTABLE CHRONIC MIGRAINE WITHOUT AURA AND WITHOUT STATUS MIGRAINOSUS: ICD-10-CM

## 2024-01-29 RX ORDER — NAPROXEN 500 MG/1
TABLET ORAL
Qty: 30 TABLET | Refills: 0 | OUTPATIENT
Start: 2024-01-29

## 2024-01-29 NOTE — TELEPHONE ENCOUNTER
RX Authorization    Medication: naproxen (NAPROSYN) 500 MG tablet     Date last refill ordered:12/19/22    Quantity ordered:30 tablet    # refills:3    Date of last clinic visit with ordering provider:10/26/23    Date of next clinic visit with ordering provider:    All pertinent protocol data (lab date/result):    Include pertinent information from patients message:

## 2024-01-30 RX ORDER — NAPROXEN 500 MG/1
TABLET ORAL
Qty: 30 TABLET | Refills: 3 | Status: SHIPPED | OUTPATIENT
Start: 2024-01-30

## 2024-02-12 ENCOUNTER — OFFICE VISIT (OUTPATIENT)
Dept: NEUROLOGY | Facility: CLINIC | Age: 24
End: 2024-02-12
Payer: COMMERCIAL

## 2024-02-12 VITALS
OXYGEN SATURATION: 100 % | DIASTOLIC BLOOD PRESSURE: 85 MMHG | SYSTOLIC BLOOD PRESSURE: 128 MMHG | HEART RATE: 99 BPM | RESPIRATION RATE: 18 BRPM

## 2024-02-12 DIAGNOSIS — G43.719 INTRACTABLE CHRONIC MIGRAINE WITHOUT AURA AND WITHOUT STATUS MIGRAINOSUS: Primary | ICD-10-CM

## 2024-02-12 PROCEDURE — 64615 CHEMODENERV MUSC MIGRAINE: CPT | Performed by: NURSE PRACTITIONER

## 2024-02-12 ASSESSMENT — HEADACHE IMPACT TEST (HIT 6)
HIT6 TOTAL SCORE: 53
WHEN YOU HAVE A HEADACHE HOW OFTEN DO YOU WISH YOU COULD LIE DOWN: RARELY
HOW OFTEN HAVE YOU FELT TOO TIRED TO WORK BECAUSE OF YOUR HEADACHES: RARELY
HOW OFTEN DID HEADACHS LIMIT CONCENTRATION ON WORK OR DAILY ACTIVITY: SOMETIMES
HOW OFTEN DO HEADACHES LIMIT YOUR DAILY ACTIVITIES: RARELY
HOW OFTEN HAVE YOU FELT FED UP OR IRRITATED BECAUSE OF YOUR HEADACHES: VERY OFTEN
WHEN YOU HAVE HEADACHES HOW OFTEN IS THE PAIN SEVERE: RARELY

## 2024-02-12 ASSESSMENT — PAIN SCALES - GENERAL: PAINLEVEL: NO PAIN (0)

## 2024-02-12 NOTE — PROGRESS NOTES
PROCEDURE NOTE:     [facility]  Botulinum Toxin Procedure     Headache Neurology     02/12/24       Procedure:  OnabotulinumtoxinA injections for chronic migraine  Indication:  Chronic migraine     Patient suffers from severe intractable headaches.  She was referred for onabotulinumtoxinA injections for headache.  Risks, benefits, and alternatives were discussed.  All questions were answered and consent given.  She decided to proceed with the injections.      Prior to initiation of botulinum toxin injections, Ms. Carvalho reported 20-25 headache days per month, with 8 severe headache days per month. Her headaches are quite disabling and often interfere with her ability to function normally.     Date of last injections: initial botox today-Nov 13, 2023   Ms. Carvalho reports 10 headache days per month currently, with 5 severe headache days in 90 days .  She has noticed a wearing off phenomenon prior to this round of botulinum toxin injections, lasting 1 weeks.     Botulinum toxin injections have improved their functioning. Initial round today     She has attempted other migraine prophylactic treatments in the past, which have included:   Headache treatment -  Ajovy currently on since 2021  Rizatriptan and naproxen as needed and both help -current  topiramate 100 mg BID for 4 years but start causing side effects and stopped in September  Amitriptyline 150 mg caused weight gain  Tried propranolol -caused side effects and did not help  Sertraline -  Sumatriptan as needed but takes it almost every day  Tried mom's nurtec -helps a little bit   On Prestiq for depression in the past and changed to venlafaxine in the spring of 2023  Depression is stable. Has been managed by PCP.    Has established mental health care for depression. Got better.         Ms. Canos pain was assessed prior to the procedure.  She rated her pain today as 6 out of 10.     Procedural Pause: Procedural pause was conducted to verify correct patient  identity, procedure to be performed, correct side and site, correct patient position, and special requirements. Appropriate hand hygiene was utilized, and each injection site was prepped with alcohol wipe or Chloraprep swab.      Procedure Details: 200 units of onabotulinumtoxinA was diluted in 4 mL 0.9% normal saline. A total of 155 units of onabotulinumtoxinA were injected using 30 gauge 0.5 in needles into the muscles listed below. 45 units of onabotulinumtoxinA were wasted.         GOAL OF PROCEDURE:  The goal of this procedure is to decrease pain and enhance functional independence.     CONSENT:  The risks, benefits, and treatment options were discussed with the patient who agreed to proceed.     Written consent was obtained      EQUIPMENT USED:  Needles-30 gauge, 0.5 inches for injections  Four 1-ml tuberculin syringes for injections  One sodium chloride 10 ml vial preservative free  Alcohol swabs     SKIN PREPARATION:  Skin preparation was performed using an alcohol wipe.        AREA/MUSCLE INJECTED:  155 units of Botox  Right upper Trapezius (upper cervical) - 5 units of Botox at 3 site/s.   Left upper Trapezius (upper cervical) - 5 units of Botox at 3 site/s.      Right cervical paraspinals - 5 units of Botox at 2 site/s.   Left cervical paraspinals - 5 units of Botox at 2 site/s.      Left occipitalis - 5 units of Botox at 3 site/s.  Right occipitalis -5 units of Botox at 3 site/s     Right Frontalis - 5 units of Botox at 2 site/s.  Left Frontalis - 5 units of Botox at 2 site/s.     Right Temporalis - 5 units of Botox at 4 site/s.  Left Temporalis - 5 units of Botox at 4 site/s.     Right  - 5 units of Botox at 1 site/s.              Left  - 5 units of Botox at 1 site/s.     Procerus - 5 units of Botox at 1 site/s.     RESPONSE TO PROCEDURE:  tolerated the procedure well and there were no immediate complications.  Patient was allowed to recover for an appropriate period of time and was  discharged home in stable condition.     FOLLOW UP:     Repeat Botox injections in 12 weeks        This procedure was performed under a hospital privileging agreement with RAYNA Huertas, [facility]

## 2024-02-12 NOTE — LETTER
2/12/2024       RE: Vivian Carvalho  [address] [facility] [address]       Dear Colleague,    Thank you for referring your patient, Vivian Carvalho, to the [facility] NEUROLOGY CLINIC [place] at [facility]. Please see a copy of my visit note below.    PROCEDURE NOTE:     [facility]  Botulinum Toxin Procedure     Headache Neurology     02/12/24       Procedure:  OnabotulinumtoxinA injections for chronic migraine  Indication:  Chronic migraine     Patient suffers from severe intractable headaches.  She was referred for onabotulinumtoxinA injections for headache.  Risks, benefits, and alternatives were discussed.  All questions were answered and consent given.  She decided to proceed with the injections.      Prior to initiation of botulinum toxin injections, Ms. Carvalho reported 20-25 headache days per month, with 8 severe headache days per month. Her headaches are quite disabling and often interfere with her ability to function normally.     Date of last injections: initial botox today-Nov 13, 2023   Ms. Carvalho reports 10 headache days per month currently, with 5 severe headache days in 90 days .  She has noticed a wearing off phenomenon prior to this round of botulinum toxin injections, lasting 1 weeks.     Botulinum toxin injections have improved their functioning. Initial round today     She has attempted other migraine prophylactic treatments in the past, which have included:   Headache treatment -  Ajovy currently on since 2021  Rizatriptan and naproxen as needed and both help -current  topiramate 100 mg BID for 4 years but start causing side effects and stopped in September  Amitriptyline 150 mg caused weight gain  Tried propranolol -caused side effects and did not help  Sertraline -  Sumatriptan as needed but takes it almost every day  Tried mom's nurtec -helps a little bit   On Prestiq for depression in the past and changed to  venlafaxine in the spring of 2023  Depression is stable. Has been managed by PCP.    Has established mental health care for depression. Got better.         Ms. Carvalho's pain was assessed prior to the procedure.  She rated her pain today as 6 out of 10.     Procedural Pause: Procedural pause was conducted to verify correct patient identity, procedure to be performed, correct side and site, correct patient position, and special requirements. Appropriate hand hygiene was utilized, and each injection site was prepped with alcohol wipe or Chloraprep swab.      Procedure Details: 200 units of onabotulinumtoxinA was diluted in 4 mL 0.9% normal saline. A total of 155 units of onabotulinumtoxinA were injected using 30 gauge 0.5 in needles into the muscles listed below. 45 units of onabotulinumtoxinA were wasted.         GOAL OF PROCEDURE:  The goal of this procedure is to decrease pain and enhance functional independence.     CONSENT:  The risks, benefits, and treatment options were discussed with the patient who agreed to proceed.     Written consent was obtained      EQUIPMENT USED:  Needles-30 gauge, 0.5 inches for injections  Four 1-ml tuberculin syringes for injections  One sodium chloride 10 ml vial preservative free  Alcohol swabs     SKIN PREPARATION:  Skin preparation was performed using an alcohol wipe.        AREA/MUSCLE INJECTED:  155 units of Botox  Right upper Trapezius (upper cervical) - 5 units of Botox at 3 site/s.   Left upper Trapezius (upper cervical) - 5 units of Botox at 3 site/s.      Right cervical paraspinals - 5 units of Botox at 2 site/s.   Left cervical paraspinals - 5 units of Botox at 2 site/s.      Left occipitalis - 5 units of Botox at 3 site/s.  Right occipitalis -5 units of Botox at 3 site/s     Right Frontalis - 5 units of Botox at 2 site/s.  Left Frontalis - 5 units of Botox at 2 site/s.     Right Temporalis - 5 units of Botox at 4 site/s.  Left Temporalis - 5 units of Botox at 4 site/s.      Right  - 5 units of Botox at 1 site/s.              Left  - 5 units of Botox at 1 site/s.     Procerus - 5 units of Botox at 1 site/s.     RESPONSE TO PROCEDURE:  tolerated the procedure well and there were no immediate complications.  Patient was allowed to recover for an appropriate period of time and was discharged home in stable condition.     FOLLOW UP:     Repeat Botox injections in 12 weeks        This procedure was performed under a hospital privileging agreement with Dr. Norwood          Again, thank you for allowing me to participate in the care of your patient.      Sincerely,    RAYNA Rios CNP

## 2024-03-08 DIAGNOSIS — F41.1 GAD (GENERALIZED ANXIETY DISORDER): ICD-10-CM

## 2024-03-08 DIAGNOSIS — F33.2 SEVERE EPISODE OF RECURRENT MAJOR DEPRESSIVE DISORDER, WITHOUT PSYCHOTIC FEATURES (H): ICD-10-CM

## 2024-03-08 RX ORDER — VENLAFAXINE HYDROCHLORIDE 150 MG/1
150 CAPSULE, EXTENDED RELEASE ORAL DAILY
Qty: 90 CAPSULE | Refills: 0 | Status: SHIPPED | OUTPATIENT
Start: 2024-03-08 | End: 2024-03-26

## 2024-03-08 NOTE — TELEPHONE ENCOUNTER
Please call patient. Sending 90 days but she needs to schedule with a new provider for additional refills.    Good Ch PA-C on 3/8/2024 at 10:00 AM (covering for Lia Berrios)

## 2024-03-11 ENCOUNTER — E-VISIT (OUTPATIENT)
Dept: FAMILY MEDICINE | Facility: CLINIC | Age: 24
End: 2024-03-11
Payer: COMMERCIAL

## 2024-03-11 DIAGNOSIS — R19.7 DIARRHEA, UNSPECIFIED TYPE: Primary | ICD-10-CM

## 2024-03-11 PROCEDURE — 99207 PR NON-BILLABLE SERV PER CHARTING: CPT | Performed by: PHYSICIAN ASSISTANT

## 2024-03-12 ENCOUNTER — ALLIED HEALTH/NURSE VISIT (OUTPATIENT)
Dept: FAMILY MEDICINE | Facility: CLINIC | Age: 24
End: 2024-03-12
Payer: COMMERCIAL

## 2024-03-12 DIAGNOSIS — Z30.42 DEPO-PROVERA CONTRACEPTIVE STATUS: Primary | ICD-10-CM

## 2024-03-12 PROCEDURE — 96372 THER/PROPH/DIAG INJ SC/IM: CPT | Performed by: NURSE PRACTITIONER

## 2024-03-12 PROCEDURE — 99207 PR NO CHARGE NURSE ONLY: CPT

## 2024-03-12 PROCEDURE — 96372 THER/PROPH/DIAG INJ SC/IM: CPT

## 2024-03-12 RX ORDER — MEDROXYPROGESTERONE ACETATE 150 MG/ML
150 INJECTION, SUSPENSION INTRAMUSCULAR
Status: COMPLETED | OUTPATIENT
Start: 2024-03-12 | End: 2024-03-18

## 2024-03-12 RX ADMIN — MEDROXYPROGESTERONE ACETATE 150 MG: 150 INJECTION, SUSPENSION INTRAMUSCULAR at 09:27

## 2024-03-12 RX ADMIN — MEDROXYPROGESTERONE ACETATE 150 MG: 150 INJECTION, SUSPENSION INTRAMUSCULAR at 10:29

## 2024-03-12 NOTE — PROGRESS NOTES
Patient here in clinic for Depo Provera shot. Order is . Appears patient has been on Depo since as early as  consistently. Okay'd for administration today but informed patient that given duration of use would recommend switching to other form of birth control given duration of Depo use.     Marcella Schultz PA-C on 3/12/2024 at 9:23 AM

## 2024-03-12 NOTE — PROGRESS NOTES

## 2024-03-18 PROCEDURE — 96372 THER/PROPH/DIAG INJ SC/IM: CPT

## 2024-03-18 RX ADMIN — MEDROXYPROGESTERONE ACETATE 150 MG: 150 INJECTION, SUSPENSION INTRAMUSCULAR at 09:23

## 2024-03-26 ENCOUNTER — ANCILLARY PROCEDURE (OUTPATIENT)
Dept: GENERAL RADIOLOGY | Facility: CLINIC | Age: 24
End: 2024-03-26
Payer: COMMERCIAL

## 2024-03-26 ENCOUNTER — OFFICE VISIT (OUTPATIENT)
Dept: FAMILY MEDICINE | Facility: CLINIC | Age: 24
End: 2024-03-26
Payer: COMMERCIAL

## 2024-03-26 VITALS
DIASTOLIC BLOOD PRESSURE: 92 MMHG | HEIGHT: 64 IN | RESPIRATION RATE: 12 BRPM | SYSTOLIC BLOOD PRESSURE: 123 MMHG | WEIGHT: 293 LBS | BODY MASS INDEX: 50.02 KG/M2 | HEART RATE: 89 BPM | OXYGEN SATURATION: 99 % | TEMPERATURE: 98.1 F

## 2024-03-26 DIAGNOSIS — R06.09 DYSPNEA ON EXERTION: ICD-10-CM

## 2024-03-26 DIAGNOSIS — M25.562 PAIN IN BOTH KNEES, UNSPECIFIED CHRONICITY: ICD-10-CM

## 2024-03-26 DIAGNOSIS — Z30.9 ENCOUNTER FOR CONTRACEPTIVE MANAGEMENT, UNSPECIFIED TYPE: ICD-10-CM

## 2024-03-26 DIAGNOSIS — M25.561 PAIN IN BOTH KNEES, UNSPECIFIED CHRONICITY: ICD-10-CM

## 2024-03-26 DIAGNOSIS — Z23 NEED FOR COVID-19 VACCINE: ICD-10-CM

## 2024-03-26 DIAGNOSIS — Z00.00 ROUTINE GENERAL MEDICAL EXAMINATION AT A HEALTH CARE FACILITY: Primary | ICD-10-CM

## 2024-03-26 DIAGNOSIS — F33.2 SEVERE EPISODE OF RECURRENT MAJOR DEPRESSIVE DISORDER, WITHOUT PSYCHOTIC FEATURES (H): ICD-10-CM

## 2024-03-26 DIAGNOSIS — E55.9 VITAMIN D DEFICIENCY: ICD-10-CM

## 2024-03-26 DIAGNOSIS — F41.1 GAD (GENERALIZED ANXIETY DISORDER): ICD-10-CM

## 2024-03-26 LAB
ERYTHROCYTE [DISTWIDTH] IN BLOOD BY AUTOMATED COUNT: 12.3 % (ref 10–15)
HCT VFR BLD AUTO: 40 % (ref 35–47)
HGB BLD-MCNC: 13.9 G/DL (ref 11.7–15.7)
MCH RBC QN AUTO: 30 PG (ref 26.5–33)
MCHC RBC AUTO-ENTMCNC: 34.8 G/DL (ref 31.5–36.5)
MCV RBC AUTO: 86 FL (ref 78–100)
PLATELET # BLD AUTO: 232 10E3/UL (ref 150–450)
RBC # BLD AUTO: 4.64 10E6/UL (ref 3.8–5.2)
WBC # BLD AUTO: 9.5 10E3/UL (ref 4–11)

## 2024-03-26 PROCEDURE — 91320 SARSCV2 VAC 30MCG TRS-SUC IM: CPT

## 2024-03-26 PROCEDURE — 99214 OFFICE O/P EST MOD 30 MIN: CPT | Mod: 25

## 2024-03-26 PROCEDURE — 84443 ASSAY THYROID STIM HORMONE: CPT

## 2024-03-26 PROCEDURE — 80061 LIPID PANEL: CPT

## 2024-03-26 PROCEDURE — 99395 PREV VISIT EST AGE 18-39: CPT

## 2024-03-26 PROCEDURE — 85027 COMPLETE CBC AUTOMATED: CPT

## 2024-03-26 PROCEDURE — 80048 BASIC METABOLIC PNL TOTAL CA: CPT

## 2024-03-26 PROCEDURE — 36415 COLL VENOUS BLD VENIPUNCTURE: CPT

## 2024-03-26 PROCEDURE — 82306 VITAMIN D 25 HYDROXY: CPT

## 2024-03-26 PROCEDURE — 90480 ADMN SARSCOV2 VAC 1/ONLY CMP: CPT

## 2024-03-26 PROCEDURE — 73562 X-RAY EXAM OF KNEE 3: CPT | Mod: TC | Performed by: STUDENT IN AN ORGANIZED HEALTH CARE EDUCATION/TRAINING PROGRAM

## 2024-03-26 RX ORDER — VENLAFAXINE HYDROCHLORIDE 150 MG/1
150 CAPSULE, EXTENDED RELEASE ORAL DAILY
Qty: 90 CAPSULE | Refills: 3 | Status: SHIPPED | OUTPATIENT
Start: 2024-03-26

## 2024-03-26 RX ORDER — MEDROXYPROGESTERONE ACETATE 150 MG/ML
150 INJECTION, SUSPENSION INTRAMUSCULAR
Status: ACTIVE | OUTPATIENT
Start: 2024-03-26 | End: 2025-03-21

## 2024-03-26 RX ORDER — ALBUTEROL SULFATE 90 UG/1
AEROSOL, METERED RESPIRATORY (INHALATION)
Qty: 18 G | Refills: 1 | Status: SHIPPED | OUTPATIENT
Start: 2024-03-26

## 2024-03-26 SDOH — HEALTH STABILITY: PHYSICAL HEALTH: ON AVERAGE, HOW MANY DAYS PER WEEK DO YOU ENGAGE IN MODERATE TO STRENUOUS EXERCISE (LIKE A BRISK WALK)?: 1 DAY

## 2024-03-26 ASSESSMENT — LIFESTYLE VARIABLES
HOW MANY STANDARD DRINKS CONTAINING ALCOHOL DO YOU HAVE ON A TYPICAL DAY: 3 OR 4
HOW OFTEN DO YOU HAVE A DRINK CONTAINING ALCOHOL: MONTHLY OR LESS
SKIP TO QUESTIONS 9-10: 0
HOW OFTEN DO YOU HAVE SIX OR MORE DRINKS ON ONE OCCASION: LESS THAN MONTHLY
AUDIT-C TOTAL SCORE: 3

## 2024-03-26 ASSESSMENT — PATIENT HEALTH QUESTIONNAIRE - PHQ9
SUM OF ALL RESPONSES TO PHQ QUESTIONS 1-9: 14
10. IF YOU CHECKED OFF ANY PROBLEMS, HOW DIFFICULT HAVE THESE PROBLEMS MADE IT FOR YOU TO DO YOUR WORK, TAKE CARE OF THINGS AT HOME, OR GET ALONG WITH OTHER PEOPLE: SOMEWHAT DIFFICULT
SUM OF ALL RESPONSES TO PHQ QUESTIONS 1-9: 14

## 2024-03-26 ASSESSMENT — SOCIAL DETERMINANTS OF HEALTH (SDOH)
DO YOU BELONG TO ANY CLUBS OR ORGANIZATIONS SUCH AS CHURCH GROUPS UNIONS, FRATERNAL OR ATHLETIC GROUPS, OR SCHOOL GROUPS?: NO
HOW OFTEN DO YOU GET TOGETHER WITH FRIENDS OR RELATIVES?: ONCE A WEEK
HOW OFTEN DO YOU GET TOGETHER WITH FRIENDS OR RELATIVES?: ONCE A WEEK
HOW OFTEN DO YOU ATTEND CHURCH OR RELIGIOUS SERVICES?: NEVER
IN A TYPICAL WEEK, HOW MANY TIMES DO YOU TALK ON THE PHONE WITH FAMILY, FRIENDS, OR NEIGHBORS?: MORE THAN THREE TIMES A WEEK
ARE YOU MARRIED, WIDOWED, DIVORCED, SEPARATED, NEVER MARRIED, OR LIVING WITH A PARTNER?: NEVER MARRIED
HOW OFTEN DO YOU ATTENT MEETINGS OF THE CLUB OR ORGANIZATION YOU BELONG TO?: NEVER

## 2024-03-26 ASSESSMENT — ANXIETY QUESTIONNAIRES
6. BECOMING EASILY ANNOYED OR IRRITABLE: MORE THAN HALF THE DAYS
1. FEELING NERVOUS, ANXIOUS, OR ON EDGE: SEVERAL DAYS
GAD7 TOTAL SCORE: 8
3. WORRYING TOO MUCH ABOUT DIFFERENT THINGS: MORE THAN HALF THE DAYS
4. TROUBLE RELAXING: NOT AT ALL
GAD7 TOTAL SCORE: 8
7. FEELING AFRAID AS IF SOMETHING AWFUL MIGHT HAPPEN: SEVERAL DAYS
IF YOU CHECKED OFF ANY PROBLEMS ON THIS QUESTIONNAIRE, HOW DIFFICULT HAVE THESE PROBLEMS MADE IT FOR YOU TO DO YOUR WORK, TAKE CARE OF THINGS AT HOME, OR GET ALONG WITH OTHER PEOPLE: SOMEWHAT DIFFICULT
5. BEING SO RESTLESS THAT IT IS HARD TO SIT STILL: NOT AT ALL
2. NOT BEING ABLE TO STOP OR CONTROL WORRYING: MORE THAN HALF THE DAYS
7. FEELING AFRAID AS IF SOMETHING AWFUL MIGHT HAPPEN: SEVERAL DAYS
GAD7 TOTAL SCORE: 8
8. IF YOU CHECKED OFF ANY PROBLEMS, HOW DIFFICULT HAVE THESE MADE IT FOR YOU TO DO YOUR WORK, TAKE CARE OF THINGS AT HOME, OR GET ALONG WITH OTHER PEOPLE?: SOMEWHAT DIFFICULT

## 2024-03-26 NOTE — PROGRESS NOTES
Preventive Care Visit  [facility]  Marcella Schultz PA-C, Family Medicine  Mar 26, 2024    Assessment & Plan     (Z00.00) Routine general medical examination at a health care facility  (primary encounter diagnosis)  Stable exam. Routine screening labs, patient is not fasting. Covid booster in clinic today. Follow up in 1 year for annual wellness; sooner as needed for acute concerns.  Plan: COVID-19 12+ (2023-24) (PFIZER), REVIEW OF         HEALTH MAINTENANCE PROTOCOL ORDERS, TSH with         free T4 reflex, CBC with platelets, Basic         metabolic panel  (Ca, Cl, CO2, Creat, Gluc, K,         Na, BUN), Lipid panel reflex to direct LDL         Non-fasting          (F33.2) Severe episode of recurrent major depressive disorder, without psychotic features (H)  Well controlled with use of Effexor. Ran out of her medication for a few weeks and had worsening of her depression but otherwise well controlled when on the medication. No new side effects of the medication. Refill provided.  Plan: venlafaxine (EFFEXOR XR) 150 MG 24 hr capsule          (F41.1) MORTEZA (generalized anxiety disorder)  See above.   Plan: venlafaxine (EFFEXOR XR) 150 MG 24 hr capsule          (M25.561,  M25.562) Pain in both knees, unspecified chronicity  Bilateral knee pain. Baseline xray imaging today in clinic. Likely due to weight and history of high impact activities. Xray imaging normal in clinic today.   Plan: XR Knee Bilateral 3 Views    (R06.09) Dyspnea on exertion  Well controlled with use of albuterol inhaler PRN. No new side effects of the medication. Refill provided.  Plan: albuterol (PROAIR HFA/PROVENTIL HFA/VENTOLIN         HFA) 108 (90 Base) MCG/ACT inhaler          (E55.9) Vitamin D deficiency  Vitamin D low in the past. Will recheck today.  Plan: Vitamin D Deficiency          (Z30.9) Encounter for contraceptive management, unspecified type  Currently on Depo shot. Has been on this for several years. Will discuss  "switching to other form of birth control at next year physical.   Plan: medroxyPROGESTERone (DEPO-PROVERA) injection         150 mg          (Z23) Need for COVID-19 vaccine  Plan: COVID-19 12+ (2023-24) (PFIZER)          Patient has been advised of split billing requirements and indicates understanding: Yes    BMI  Estimated body mass index is 51.79 kg/m  as calculated from the following:    Height as of this encounter: 1.613 m (5' 3.5\").    Weight as of this encounter: 134.7 kg (297 lb).     Counseling  Appropriate preventive services were discussed with this patient, including applicable screening as appropriate for fall prevention, nutrition, physical activity, Tobacco-use cessation, weight loss and cognition.  Checklist reviewing preventive services available has been given to the patient.  Reviewed patient's diet, addressing concerns and/or questions.   She is at risk for lack of exercise and has been provided with information to increase physical activity for the benefit of her well-being.   She is at risk for psychosocial distress and has been provided with information to reduce risk.   The patient's PHQ-9 score is consistent with moderate depression. She was provided with information regarding depression.       Follow up in 1 year for physical; sooner with any acute concerns.    Samuel Park is a 23 year old, presenting for the following:  Physical        3/26/2024     1:05 PM   Additional Questions   Roomed by Mary VIDAL        Health Care Directive  Patient does not have a Health Care Directive or Living Will: Discussed advance care planning with patient; however, patient declined at this time.    HPI    -Occasional ingrown toenails. Does get routine pedicures which seems to help with symptoms.     -Bilateral knee pain. 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 No recent trauma or injuries to the knees.     Headaches: Seen by Neurology. Getting botox for headaches and " that has been helping with headaches. Also has maxalt.        3/26/2024   General Health   How would you rate your overall physical health? (!) FAIR   Feel stress (tense, anxious, or unable to sleep) Only a little    Only a little   (!) STRESS CONCERN      3/26/2024   Nutrition   Three or more servings of calcium each day? (!) NO   Diet: Regular (no restrictions)   How many servings of fruit and vegetables per day? (!) 0-1   How many sweetened beverages each day? 0-1         3/26/2024   Exercise   Days per week of moderate/strenous exercise 1 day    1 day   (!) EXERCISE CONCERN      3/26/2024   Social Factors   Frequency of gathering with friends or relatives Once a week    Once a week   Worry food won't last until get money to buy more No    No   Food not last or not have enough money for food? No    No   Do you have housing?  Yes    Yes   Are you worried about losing your housing? No    No   Lack of transportation? No    No   Unable to get utilities (heat,electricity)? No    No         3/26/2024   Dental   Dentist two times every year? Yes         3/26/2024   TB Screening   Were you born outside of the [place]? Yes       Today's PHQ-9 Score:       3/26/2024    12:38 PM   PHQ-9 SCORE   PHQ-9 Total Score MyChart 14 (Moderate depression)   PHQ-9 Total Score 14         3/26/2024   Substance Use   Frequency of drinking alcohol? Monthly or less   Alcohol more than 3/day or more than 7/wk No   Do you use any other substances recreationally? No     Social History     Tobacco Use    Smoking status: Never    Smokeless tobacco: Never   Vaping Use    Vaping Use: Never used   Substance Use Topics    Alcohol use: Yes     Comment: occ    Drug use: Never         3/26/2024   STI Screening   New sexual partner(s) since last STI/HIV test? No     History of abnormal Pap smear: NO - age 21-29 PAP every 3 years recommended.           3/26/2024   Contraception/Family Planning   Questions about contraception or family planning No     Reviewed  and updated as needed this visit by Provider   Tobacco  Allergies  Meds  Problems  Med Hx  Surg Hx  Fam Hx            Past Medical History:   Diagnosis Date    Depressive disorder 2019    Uncomplicated asthma 2017     History reviewed. No pertinent surgical history.    BP Readings from Last 3 Encounters:   03/26/24 (!) 123/92   02/12/24 128/85   09/01/23 116/78    Wt Readings from Last 3 Encounters:   03/26/24 134.7 kg (297 lb)   10/26/23 122.5 kg (270 lb)   09/01/23 127.6 kg (281 lb 4.8 oz)        Patient Active Problem List   Diagnosis    Migraine with aura and without status migrainosus, not intractable    Encounter for surveillance of injectable contraceptive    Morbid obesity (H)    Current moderate episode of major depressive disorder without prior episode (H)    Gastroesophageal reflux disease, esophagitis presence not specified    Vitamin D deficiency     History reviewed. No pertinent surgical history.    Social History     Tobacco Use    Smoking status: Never    Smokeless tobacco: Never   Substance Use Topics    Alcohol use: Yes     Comment: occ     Family History   Problem Relation Age of Onset    Hypertension Mother     Migraines Mother     Depression Mother     Anxiety Disorder Mother     Asthma Mother     Obesity Mother     Hyperlipidemia Father     Depression Maternal Half-Sister     Anxiety Disorder Maternal Half-Sister     Asthma Maternal Half-Sister     Depression Maternal Half-Sister     Anxiety Disorder Maternal Half-Sister     Mental Illness Maternal Half-Sister         Bipolar disorder    Thyroid Disease Maternal Half-Sister     Obesity Maternal Half-Sister          Current Outpatient Medications   Medication Sig Dispense Refill    albuterol (PROAIR HFA/PROVENTIL HFA/VENTOLIN HFA) 108 (90 Base) MCG/ACT inhaler Inhale 2 puffs 15-20 minutes before exercise or as needed every 4-6 hours for SOB 1 Inhaler 3    Fremanezumab-vfrm (AJOVY) 225 MG/1.5ML SOAJ Inject 225 mg Subcutaneous every 30  "days 1.5 mL 11    naproxen (NAPROSYN) 500 MG tablet TAKE 1 TABLET BY MOUTH EVERY 12 HOURS AS NEEDED WITH FOOD FOR HEADACHE 30 tablet 3    rizatriptan (MAXALT-MLT) 5 MG ODT TAKE 1-2 TABLETS BY MOUTH AT ONSET OF HEADACHE FOR MIGRAINE MAY REPEAT IN 2 HOURS IF NEEDED MAX DOSE 30MG IN 24 HOURS 18 tablet 6    venlafaxine (EFFEXOR XR) 150 MG 24 hr capsule Take 1 capsule by mouth once daily 90 capsule 0     Allergies   Allergen Reactions    Pcn [Penicillins]      No reaction in her past, but siblings have had reactions     Review of Systems  Constitutional, HEENT, cardiovascular, pulmonary, GI, , musculoskeletal, neuro, skin, endocrine and psych systems are negative, except as otherwise noted.       Objective    Exam  BP (!) 123/92 (BP Location: Right arm, Patient Position: Chair, Cuff Size: Adult Large)   Pulse 89   Temp 98.1  F (36.7  C) (Oral)   Resp 12   Ht 1.613 m (5' 3.5\")   Wt 134.7 kg (297 lb)   SpO2 99%   BMI 51.79 kg/m     Estimated body mass index is 51.79 kg/m  as calculated from the following:    Height as of this encounter: 1.613 m (5' 3.5\").    Weight as of this encounter: 134.7 kg (297 lb).    Physical Exam  GENERAL: alert and no distress  EYES: Eyes grossly normal to inspection, PERRL and conjunctivae and sclerae normal  HENT: ear canals and TM's normal, nose and mouth without ulcers or lesions  NECK: no adenopathy, no asymmetry, masses, or scars  RESP: lungs clear to auscultation - no rales, rhonchi or wheezes  CV: regular rate and rhythm, normal S1 S2, no S3 or S4, no murmur, click or rub, no peripheral edema  ABDOMEN: soft, nontender, no masses and bowel sounds normal  MS: no gross musculoskeletal defects noted, no edema  SKIN: no suspicious lesions or rashes  NEURO: Normal strength and tone, mentation intact and speech normal  PSYCH: mentation appears normal, affect normal/bright        Signed Electronically by: Marcella Schultz PA-C      Answers submitted by the patient for this visit:  Patient " Health Questionnaire (Submitted on 3/26/2024)  If you checked off any problems, how difficult have these problems made it for you to do your work, take care of things at home, or get along with other people?: Somewhat difficult  PHQ9 TOTAL SCORE: 14  MORTEZA-7 (Submitted on 3/26/2024)  MORTEZA 7 TOTAL SCORE: 8

## 2024-03-26 NOTE — PATIENT INSTRUCTIONS
Preventive Care Advice   This is general advice given by our system to help you stay healthy. However, your care team may have specific advice just for you. Please talk to your care team about your preventive care needs.  Nutrition  Eat 5 or more servings of fruits and vegetables each day.  Try wheat bread, brown rice and whole grain pasta (instead of white bread, rice, and pasta).  Get enough calcium and vitamin D. Check the label on foods and aim for 100% of the RDA (recommended daily allowance).  Lifestyle  Exercise at least 150 minutes each week   (30 minutes a day, 5 days a week).  Do muscle strengthening activities 2 days a week. These help control your weight and prevent disease.  No smoking.  Wear sunscreen to prevent skin cancer.  Have a dental exam and cleaning every 6 months.  Yearly exams  See your health care team every year to talk about:  Any changes in your health.  Any medicines your care team has prescribed.  Preventive care, family planning, and ways to prevent chronic diseases.  Shots (vaccines)   HPV shots (up to age 26), if you've never had them before.  Hepatitis B shots (up to age 59), if you've never had them before.  COVID-19 shot: Get this shot when it's due.  Flu shot: Get a flu shot every year.  Tetanus shot: Get a tetanus shot every 10 years.  Pneumococcal, hepatitis A, and RSV shots: Ask your care team if you need these based on your risk.  Shingles shot (for age 50 and up).  General health tests  Diabetes screening:  Starting at age 35, Get screened for diabetes at least every 3 years.  If you are younger than age 35, ask your care team if you should be screened for diabetes.  Cholesterol test: At age 39, start having a cholesterol test every 5 years, or more often if advised.  Bone density scan (DEXA): At age 50, ask your care team if you should have this scan for osteoporosis (brittle bones).  Hepatitis C: Get tested at least once in your life.  STIs (sexually transmitted  infections)  Before age 24: Ask your care team if you should be screened for STIs.  After age 24: Get screened for STIs if you're at risk. You are at risk for STIs (including HIV) if:  You are sexually active with more than one person.  You don't use condoms every time.  You or a partner was diagnosed with a sexually transmitted infection.  If you are at risk for HIV, ask about PrEP medicine to prevent HIV.  Get tested for HIV at least once in your life, whether you are at risk for HIV or not.  Cancer screening tests  Cervical cancer screening: If you have a cervix, begin getting regular cervical cancer screening tests at age 21. Most people who have regular screenings with normal results can stop after age 65. Talk about this with your provider.  Breast cancer scan (mammogram): If you've ever had breasts, begin having regular mammograms starting at age 40. This is a scan to check for breast cancer.  Colon cancer screening: It is important to start screening for colon cancer at age 45.  Have a colonoscopy test every 10 years (or more often if you're at risk) Or, ask your provider about stool tests like a FIT test every year or Cologuard test every 3 years.  To learn more about your testing options, visit: https://www.Knack.it/043012.pdf.  For help making a decision, visit: https://bit.ly/xc86186.  Prostate cancer screening test: If you have a prostate and are age 55 to 69, ask your provider if you would benefit from a yearly prostate cancer screening test.  Lung cancer screening: If you are a current or former smoker age 50 to 80, ask your care team if ongoing lung cancer screenings are right for you.  For informational purposes only. Not to replace the advice of your health care provider. Copyright   2023 [place] [employer]. All rights reserved. Clinically reviewed by the [facility] Transitions Program. [employer] 637667 - REV 01/24.    Learning About Stress  What is stress?     Stress is your  body's response to a hard situation. Your body can have a physical, emotional, or mental response. Stress is a fact of life for most people, and it affects everyone differently. What causes stress for you may not be stressful for someone else.  A lot of things can cause stress. You may feel stress when you go on a job interview, take a test, or run a race. This kind of short-term stress is normal and even useful. It can help you if you need to work hard or react quickly. For example, stress can help you finish an important job on time.  Long-term stress is caused by ongoing stressful situations or events. Examples of long-term stress include long-term health problems, ongoing problems at work, or conflicts in your family. Long-term stress can harm your health.  How does stress affect your health?  When you are stressed, your body responds as though you are in danger. It makes hormones that speed up your heart, make you breathe faster, and give you a burst of energy. This is called the fight-or-flight stress response. If the stress is over quickly, your body goes back to normal and no harm is done.  But if stress happens too often or lasts too long, it can have bad effects. Long-term stress can make you more likely to get sick, and it can make symptoms of some diseases worse. If you tense up when you are stressed, you may develop neck, shoulder, or low back pain. Stress is linked to high blood pressure and heart disease.  Stress also harms your emotional health. It can make you dickey, tense, or depressed. Your relationships may suffer, and you may not do well at work or school.  What can you do to manage stress?  You can try these things to help manage stress:   Do something active. Exercise or activity can help reduce stress. Walking is a great way to get started. Even everyday activities such as housecleaning or yard work can help.  Try yoga or rose chi. These techniques combine exercise and meditation. You may need  some training at first to learn them.  Do something you enjoy. For example, listen to music or go to a movie. Practice your hobby or do volunteer work.  Meditate. This can help you relax, because you are not worrying about what happened before or what may happen in the future.  Do guided imagery. Imagine yourself in any setting that helps you feel calm. You can use online videos, books, or a teacher to guide you.  Do breathing exercises. For example:  From a standing position, bend forward from the waist with your knees slightly bent. Let your arms dangle close to the floor.  Breathe in slowly and deeply as you return to a standing position. Roll up slowly and lift your head last.  Hold your breath for just a few seconds in the standing position.  Breathe out slowly and bend forward from the waist.  Let your feelings out. Talk, laugh, cry, and express anger when you need to. Talking with supportive friends or family, a counselor, or a george leader about your feelings is a healthy way to relieve stress. Avoid discussing your feelings with people who make you feel worse.  Write. It may help to write about things that are bothering you. This helps you find out how much stress you feel and what is causing it. When you know this, you can find better ways to cope.  What can you do to prevent stress?  You might try some of these things to help prevent stress:  Manage your time. This helps you find time to do the things you want and need to do.  Get enough sleep. Your body recovers from the stresses of the day while you are sleeping.  Get support. Your family, friends, and community can make a difference in how you experience stress.  Limit your news feed. Avoid or limit time on social media or news that may make you feel stressed.  Do something active. Exercise or activity can help reduce stress. Walking is a great way to get started.  Where can you learn more?  Go to https://www.healthwise.net/patiented  Enter N032 in the  "search box to learn more about \"Learning About Stress.\"  Current as of: October 24, 2023               Content Version: 14.0    3189-7222 [employer].   Care instructions adapted under license by your healthcare professional. If you have questions about a medical condition or this instruction, always ask your healthcare professional. [employer] disclaims any warranty or liability for your use of this information.      Learning About Depression Screening  What is depression screening?  Depression screening is a way to see if you have depression symptoms. It may be done by a doctor or counselor. It's often part of a routine checkup. That's because your mental health is just as important as your physical health.  Depression is a mental health condition that affects how you feel, think, and act. You may:  Have less energy.  Lose interest in your daily activities.  Feel sad and grouchy for a long time.  Depression is very common. It affects people of all ages.  Many things can lead to depression. Some people become depressed after they have a stroke or find out they have a major illness like cancer or heart disease. The death of a loved one or a breakup may lead to depression. It can run in families. Most experts believe that a combination of inherited genes and stressful life events can cause it.  What happens during screening?  You may be asked to fill out a form about your depression symptoms. You and the doctor will discuss your answers. The doctor may ask you more questions to learn more about how you think, act, and feel.  What happens after screening?  If you have symptoms of depression, your doctor will talk to you about your options.  Doctors usually treat depression with medicines or counseling. Often, combining the two works best. Many people don't get help because they think that they'll get over the depression on their own. But people with depression may not get better unless they " "get treatment.  The cause of depression is not well understood. There may be many factors involved. But if you have depression, it's not your fault.  A serious symptom of depression is thinking about death or suicide. If you or someone you care about talks about this or about feeling hopeless, get help right away.  It's important to know that depression can be treated. Medicine, counseling, and self-care may help.  Where can you learn more?  Go to https://www.APJeT.net/patiented  Enter T185 in the search box to learn more about \"Learning About Depression Screening.\"  Current as of: June 24, 2023               Content Version: 14.0    2830-4231 [employer].   Care instructions adapted under license by your healthcare professional. If you have questions about a medical condition or this instruction, always ask your healthcare professional. [employer] disclaims any warranty or liability for your use of this information.      "

## 2024-03-27 LAB
ANION GAP SERPL CALCULATED.3IONS-SCNC: 11 MMOL/L (ref 7–15)
BUN SERPL-MCNC: 9.9 MG/DL (ref 6–20)
CALCIUM SERPL-MCNC: 9.4 MG/DL (ref 8.6–10)
CHLORIDE SERPL-SCNC: 108 MMOL/L (ref 98–107)
CHOLEST SERPL-MCNC: 162 MG/DL
CREAT SERPL-MCNC: 0.82 MG/DL (ref 0.51–0.95)
DEPRECATED HCO3 PLAS-SCNC: 22 MMOL/L (ref 22–29)
EGFRCR SERPLBLD CKD-EPI 2021: >90 ML/MIN/1.73M2
FASTING STATUS PATIENT QL REPORTED: YES
GLUCOSE SERPL-MCNC: 84 MG/DL (ref 70–99)
HDLC SERPL-MCNC: 51 MG/DL
LDLC SERPL CALC-MCNC: 96 MG/DL
NONHDLC SERPL-MCNC: 111 MG/DL
POTASSIUM SERPL-SCNC: 4.4 MMOL/L (ref 3.4–5.3)
SODIUM SERPL-SCNC: 141 MMOL/L (ref 135–145)
TRIGL SERPL-MCNC: 76 MG/DL
TSH SERPL DL<=0.005 MIU/L-ACNC: 2.39 UIU/ML (ref 0.3–4.2)
VIT D+METAB SERPL-MCNC: 10 NG/ML (ref 20–50)

## 2024-05-09 ENCOUNTER — TELEPHONE (OUTPATIENT)
Dept: FAMILY MEDICINE | Facility: CLINIC | Age: 24
End: 2024-05-09

## 2024-10-23 DIAGNOSIS — G43.719 INTRACTABLE CHRONIC MIGRAINE WITHOUT AURA AND WITHOUT STATUS MIGRAINOSUS: ICD-10-CM

## 2024-10-23 NOTE — TELEPHONE ENCOUNTER
RX Authorization    Medication: Rizatriptan (MAXALT-MLT) 5 MG ODT    Date last refill ordered: 9/27/2023    Quantity ordered: 18    # refills: 6    Date of last clinic visit with ordering provider: 10/26/2023    Date of next clinic visit with ordering provider:    All pertinent protocol data (lab date/result):    Include pertinent information from patients message:

## 2024-10-24 ENCOUNTER — MYC MEDICAL ADVICE (OUTPATIENT)
Dept: NEUROLOGY | Facility: CLINIC | Age: 24
End: 2024-10-24
Payer: COMMERCIAL

## 2024-10-24 RX ORDER — RIZATRIPTAN BENZOATE 5 MG/1
5-10 TABLET, ORALLY DISINTEGRATING ORAL
Qty: 18 TABLET | Refills: 0 | Status: SHIPPED | OUTPATIENT
Start: 2024-10-24

## 2024-10-28 ENCOUNTER — TELEPHONE (OUTPATIENT)
Dept: NEUROLOGY | Facility: CLINIC | Age: 24
End: 2024-10-28
Payer: COMMERCIAL

## 2024-10-28 NOTE — TELEPHONE ENCOUNTER
Sent Hacker[employer]t (1st Attempt) and Patient Contacted for the patient to call back and schedule the following:    Appointment type: Return Headache   Provider: Leigh Bhatti CNP   Return date: first available   Specialty phone number: [phone]  Additional appointment(s) needed: n/a  Additonal Notes: Was not able to connect with patient hard time hearing patient. My chart message was sent.    Adia Freeman on 10/28/2024 at 1:34 PM

## 2024-10-30 NOTE — TELEPHONE ENCOUNTER
Max attempts made to schedule follow -up with Davy Abraham.    Adia Freeman on 10/30/2024 at 8:12 AM

## 2025-01-21 RX ORDER — RIZATRIPTAN BENZOATE 5 MG/1
TABLET, ORALLY DISINTEGRATING ORAL
Qty: 18 TABLET | Refills: 0 | OUTPATIENT
Start: 2025-01-21